# Patient Record
Sex: MALE | Race: WHITE | NOT HISPANIC OR LATINO | Employment: OTHER | URBAN - METROPOLITAN AREA
[De-identification: names, ages, dates, MRNs, and addresses within clinical notes are randomized per-mention and may not be internally consistent; named-entity substitution may affect disease eponyms.]

---

## 2021-09-02 ENCOUNTER — TRANSFERRED RECORDS (OUTPATIENT)
Dept: HEALTH INFORMATION MANAGEMENT | Facility: CLINIC | Age: 56
End: 2021-09-02
Payer: MEDICARE

## 2021-09-29 ENCOUNTER — TRANSCRIBE ORDERS (OUTPATIENT)
Dept: OTHER | Age: 56
End: 2021-09-29

## 2021-09-29 DIAGNOSIS — I48.3 TYPICAL ATRIAL FLUTTER (H): Primary | ICD-10-CM

## 2021-11-23 ENCOUNTER — TELEPHONE (OUTPATIENT)
Dept: CARDIOLOGY | Facility: CLINIC | Age: 56
End: 2021-11-23
Payer: MEDICARE

## 2021-11-23 NOTE — TELEPHONE ENCOUNTER
[facility] fax received from [facility]. Sent to be scanned into chart.    COOKIE Cabrera

## 2021-11-29 ENCOUNTER — VIRTUAL VISIT (OUTPATIENT)
Dept: CARDIOLOGY | Facility: CLINIC | Age: 56
End: 2021-11-29
Payer: MEDICARE

## 2021-11-29 DIAGNOSIS — I48.3 TYPICAL ATRIAL FLUTTER (H): Primary | ICD-10-CM

## 2021-11-29 PROCEDURE — 99204 OFFICE O/P NEW MOD 45 MIN: CPT | Mod: 95 | Performed by: INTERNAL MEDICINE

## 2021-11-29 RX ORDER — WARFARIN SODIUM 5 MG/1
TABLET ORAL
COMMUNITY
Start: 2021-09-09 | End: 2022-03-07

## 2021-11-29 RX ORDER — POTASSIUM CHLORIDE 1500 MG/1
20 TABLET, EXTENDED RELEASE ORAL
COMMUNITY
Start: 2021-10-12

## 2021-11-29 RX ORDER — METOPROLOL SUCCINATE 25 MG/1
25 TABLET, EXTENDED RELEASE ORAL
COMMUNITY
Start: 2021-11-16 | End: 2022-03-07

## 2021-11-29 RX ORDER — FUROSEMIDE 20 MG
40 TABLET ORAL
COMMUNITY
Start: 2021-11-16

## 2021-11-29 NOTE — LETTER
11/29/2021      RE: Cruz Patel  [address]  [facility] [address]       Dear Colleague,    Thank you for the opportunity to participate in the care of your patient, Cruz Patel, at the [facility] HEART [facility]. Please see a copy of my visit note below.    Cruz is a 55 year old who is being evaluated via a billable video visit.      How would you like to obtain your AVS? MyChart  If the video visit is dropped, the invitation should be resent by: Text to cell phone: [phone]  Will anyone else be joining your video visit? No    Video Start Time: 9 AM    Video-Visit Details    Type of service:  Video Visit    Video End Time: 9:15 AM    Originating Location (pt. Location): Home    Distant Location (provider location):  [employer]     Platform used for Video Visit: [employer]     HPI: Purpose of visit: I was requested by Dr. Moi Arteaga to consult on atrial flutter    Patient is a 55-year-old gentleman without any known cardiovascular disease and risk factors such as hypertension, diabetes, known coronary heart disease, and previous stroke.  He has a BMI greater than 50, nonalcoholic fatty liver disease, and metabolic syndrome.    Patient first became aware of atrial flutter when he developed cellulitis just before the Labor Day weekend and was hospitalized.  During the hospitalization, typical atrial flutter was diagnosed incidentally by ECG.  He was asymptomatic and he does not report any symptoms attributable to the atrial flutter.  He does not report symptoms of palpitations, irregular heartbeat sensation, frequent lightheadedness, presyncope or syncope.    Cardiac investigations include twelve-lead ECG showing typical atrial flutter, 1 with a ventricular rate of 75 bpm and another with a ventricular heart rate of 107 bpm.  A transthoracic echocardiogram showed mild biatrial enlargement and left  ventricular ejection fraction of 60 to 65%.    He has been started on warfarin and is also currently taking metoprolol XL 25 mg once daily.        PAST MEDICAL HISTORY:  No past medical history on file.    CURRENT MEDICATIONS:  Current Outpatient Medications   Medication Sig Dispense Refill     furosemide (LASIX) 20 MG tablet Take 40 mg by mouth       metoprolol succinate ER (TOPROL-XL) 25 MG 24 hr tablet Take 25 mg by mouth       omeprazole (PRILOSEC) 20 MG DR capsule Take 20 mg by mouth       potassium chloride ER (KLOR-CON M) 20 MEQ CR tablet Take 20 mEq by mouth       warfarin ANTICOAGULANT (COUMADIN) 5 MG tablet Take 1 tab daily or as directed by the AMS clinic.         PAST SURGICAL HISTORY:  No past surgical history on file.    ALLERGIES:   No Known Allergies    FAMILY HISTORY:  - Premature coronary artery disease  - Atrial fibrillation  - Sudden cardiac death     SOCIAL HISTORY:  Social History     Tobacco Use     Smoking status: Never Smoker     Smokeless tobacco: Current User     Types: Chew   Substance Use Topics     Alcohol use: Yes     Drug use: None       ROS:   Constitutional: No fever, chills, or sweats. Weight stable.   ENT: No visual disturbance, ear ache, epistaxis, sore throat.   Cardiovascular: As per HPI.   Respiratory: No cough, hemoptysis.    GI: No nausea, vomiting, hematemesis, melena, or hematochezia.   : No hematuria.   Integument: Negative.   Psychiatric: Negative.   Hematologic:  Easy bruising, no easy bleeding.  Neuro: Negative.   Endocrinology: No significant heat or cold intolerance   Musculoskeletal: No myalgia.    Exam:  There were no vitals taken for this visit.  GENERAL APPEARANCE: healthy, alert and no distress    Assessment and Plan:     Typical atrial flutter    I discussed extensively with patient the implications of atrial flutter and the next steps.  I recommended a 2-week Zio patch monitor to define the burden of atrial flutter.  Once we have the results of the Zio patch  monitor, we will see patient in person at the [facility].  We will discuss face-to-face the aims, risks, and alternatives to catheter ablation of atrial flutter.  All questions and concerns were addressed and patient is happy with the plan.    Please do not hesitate to contact me if you have any questions/concerns.     Sincerely,     Gabriela Garcia MD      CC  No care team member to display  ARCADIO SALEH

## 2021-11-29 NOTE — PROGRESS NOTES
Cruz is a 55 year old who is being evaluated via a billable video visit.      How would you like to obtain your AVS? [employer]  If the video visit is dropped, the invitation should be resent by: Text to cell phone: [phone]  Will anyone else be joining your video visit? No    Video Start Time: 9 AM    Video-Visit Details    Type of service:  Video Visit    Video End Time: 9:15 AM    Originating Location (pt. Location): Home    Distant Location (provider location):  [facility] HEART [facility] [employer]     Platform used for Video Visit: [employer]     HPI: Purpose of visit: I was requested by Dr. Moi Arteaga to consult on atrial flutter    Patient is a 55-year-old gentleman without any known cardiovascular disease and risk factors such as hypertension, diabetes, known coronary heart disease, and previous stroke.  He has a BMI greater than 50, nonalcoholic fatty liver disease, and metabolic syndrome.    Patient first became aware of atrial flutter when he developed cellulitis just before the Labor Day weekend and was hospitalized.  During the hospitalization, typical atrial flutter was diagnosed incidentally by ECG.  He was asymptomatic and he does not report any symptoms attributable to the atrial flutter.  He does not report symptoms of palpitations, irregular heartbeat sensation, frequent lightheadedness, presyncope or syncope.    Cardiac investigations include twelve-lead ECG showing typical atrial flutter, 1 with a ventricular rate of 75 bpm and another with a ventricular heart rate of 107 bpm.  A transthoracic echocardiogram showed mild biatrial enlargement and left ventricular ejection fraction of 60 to 65%.    He has been started on warfarin and is also currently taking metoprolol XL 25 mg once daily.        PAST MEDICAL HISTORY:  No past medical history on file.    CURRENT MEDICATIONS:  Current Outpatient Medications   Medication Sig Dispense Refill     furosemide (LASIX) 20 MG tablet Take 40 mg by  mouth       metoprolol succinate ER (TOPROL-XL) 25 MG 24 hr tablet Take 25 mg by mouth       omeprazole (PRILOSEC) 20 MG DR capsule Take 20 mg by mouth       potassium chloride ER (KLOR-CON M) 20 MEQ CR tablet Take 20 mEq by mouth       warfarin ANTICOAGULANT (COUMADIN) 5 MG tablet Take 1 tab daily or as directed by the [facility] clinic.         PAST SURGICAL HISTORY:  No past surgical history on file.    ALLERGIES:   No Known Allergies    FAMILY HISTORY:  - Premature coronary artery disease  - Atrial fibrillation  - Sudden cardiac death     SOCIAL HISTORY:  Social History     Tobacco Use     Smoking status: Never Smoker     Smokeless tobacco: Current User     Types: Chew   Substance Use Topics     Alcohol use: Yes     Drug use: None       ROS:   Constitutional: No fever, chills, or sweats. Weight stable.   ENT: No visual disturbance, ear ache, epistaxis, sore throat.   Cardiovascular: As per HPI.   Respiratory: No cough, hemoptysis.    GI: No nausea, vomiting, hematemesis, melena, or hematochezia.   : No hematuria.   Integument: Negative.   Psychiatric: Negative.   Hematologic:  Easy bruising, no easy bleeding.  Neuro: Negative.   Endocrinology: No significant heat or cold intolerance   Musculoskeletal: No myalgia.    Exam:  There were no vitals taken for this visit.  GENERAL APPEARANCE: healthy, alert and no distress    Assessment and Plan:     Typical atrial flutter    I discussed extensively with patient the implications of atrial flutter and the next steps.  I recommended a 2-week Zio patch monitor to define the burden of atrial flutter.  Once we have the results of the Zio patch monitor, we will see patient in person at the [facility].  We will discuss face-to-face the aims, risks, and alternatives to catheter ablation of atrial flutter.  All questions and concerns were addressed and patient is happy with the plan.          CC  No care team member to display  ARCADIO SALEH

## 2021-11-29 NOTE — PATIENT INSTRUCTIONS
Thank you for coming to the [facility]; please note the following instructions:    1. 2 week zio heart monitor    2.  Follow up in 1-2 month IN CLINIC for the results        If you have any questions regarding your visit please contact your care team:     Cardiology  Telephone Number   Alicia CONKLIN, RN  Lisa LY, RN   Mena LANCASTER, RMA  Love VILLARREAL, RMEVERARDO ZIMMERMAN, LPN   [phone] (option 1)   For scheduling appts:     [phone] (select option 1)       For the Device Clinic (Pacemakers and ICD's)  RN's :  Alexandra Bullard   During business hours: [phone]    *After business hours:  [phone] (select option 4)      Normal test result notifications will be released via [employer] or mailed within 7 business days.  All other test results, will be communicated via telephone once reviewed by your cardiologist.    If you need a medication refill please contact your pharmacy.  Please allow 3 business days for your refill to be completed.    As always, thank you for trusting us with your health care needs!

## 2021-11-30 ENCOUNTER — TELEPHONE (OUTPATIENT)
Dept: CARDIOLOGY | Facility: CLINIC | Age: 56
End: 2021-11-30
Payer: MEDICARE

## 2021-11-30 NOTE — TELEPHONE ENCOUNTER
14 day Zio registered to be mailed to patient for self application. Follow up with Dr. Garcia scheduled 1/10/2021 arriving at 1:45. Left a message for patient informing him his Zio will be arriving and to call us to confirm his follow up appointment works for him.    COOKIE Cabrera

## 2021-11-30 NOTE — TELEPHONE ENCOUNTER
----- Message from Alicia Haynes RN sent at 11/29/2021 11:25 AM CST -----  Mail out 2 week zio and and follow-up IN CLINIC FOR RESULTS   Alicia

## 2021-12-02 NOTE — TELEPHONE ENCOUNTER
Spoke to patient and discussed follow up.  Patient is agreeable with the plan.  States he has not received the monitor yet but will check when he gets home.    Alicia Haynes RN  Cardiology Care Coordinator  [facility]  [phone] option 1

## 2021-12-02 NOTE — TELEPHONE ENCOUNTER
[facility] Call Center    Phone Message    May a detailed message be left on voicemail: yes     Reason for Call: Other: Cruz called returning a call from [facility]. Please give Cruz a call back when possible. Thank you!     Action Taken: Message routed to:  Other: [facility]    Travel Screening: Not Applicable

## 2021-12-03 ENCOUNTER — ALLIED HEALTH/NURSE VISIT (OUTPATIENT)
Dept: CARDIOLOGY | Facility: CLINIC | Age: 56
End: 2021-12-03
Attending: INTERNAL MEDICINE
Payer: MEDICARE

## 2021-12-03 DIAGNOSIS — I48.3 TYPICAL ATRIAL FLUTTER (H): ICD-10-CM

## 2021-12-06 ENCOUNTER — TELEPHONE (OUTPATIENT)
Dept: CARDIOLOGY | Facility: CLINIC | Age: 56
End: 2021-12-06
Payer: MEDICARE

## 2021-12-06 NOTE — TELEPHONE ENCOUNTER
[facility] Call Center    Phone Message    May a detailed message be left on voicemail: yes     Reason for Call: Other: Pt has not received the heart monitor yet.  Please call him when it's mailed out so he knows when to expect it.  Concerned that he is not going to get it in time to wear it for the 2 weeks and be able go get it back before his follow up apt.      Action Taken: Message routed to:  [facility] ([facility] Cardio    Travel Screening: Not Applicable

## 2021-12-07 NOTE — TELEPHONE ENCOUNTER
Spoke with patient and informed him the Zio website states his monitor is running late with the mail. Rescheduled his appointment with Dr. Garcia out another week.    COOKIE Cabrera

## 2021-12-07 NOTE — PROGRESS NOTES
2 week ZioXT applied to patient today. Instructions on use and removal given and mail back instructions discussed. All questions answered. Consent form signed. Device registered. Form faxed to [employer].  Device number: MONITOR MAILOUT.    Love Bowen MA

## 2022-01-17 ENCOUNTER — OFFICE VISIT (OUTPATIENT)
Dept: CARDIOLOGY | Facility: CLINIC | Age: 57
End: 2022-01-17
Payer: MEDICARE

## 2022-01-17 VITALS
WEIGHT: 315 LBS | DIASTOLIC BLOOD PRESSURE: 96 MMHG | SYSTOLIC BLOOD PRESSURE: 157 MMHG | OXYGEN SATURATION: 97 % | HEART RATE: 83 BPM

## 2022-01-17 DIAGNOSIS — Z11.59 ENCOUNTER FOR SCREENING FOR OTHER VIRAL DISEASES: Primary | ICD-10-CM

## 2022-01-17 DIAGNOSIS — E66.3 OVERWEIGHT: ICD-10-CM

## 2022-01-17 DIAGNOSIS — I48.3 TYPICAL ATRIAL FLUTTER (H): Primary | ICD-10-CM

## 2022-01-17 PROCEDURE — 99214 OFFICE O/P EST MOD 30 MIN: CPT | Performed by: INTERNAL MEDICINE

## 2022-01-17 RX ORDER — POTASSIUM CHLORIDE 750 MG/1
20 TABLET, EXTENDED RELEASE ORAL
Status: CANCELLED | OUTPATIENT
Start: 2022-01-17

## 2022-01-17 RX ORDER — LIDOCAINE 40 MG/G
CREAM TOPICAL
Status: CANCELLED | OUTPATIENT
Start: 2022-01-17

## 2022-01-17 RX ORDER — POTASSIUM CHLORIDE 750 MG/1
40 TABLET, EXTENDED RELEASE ORAL
Status: CANCELLED | OUTPATIENT
Start: 2022-01-17

## 2022-01-17 NOTE — NURSING NOTE
Chief Complaint   Patient presents with     Follow Up     Typical atrial flutter, Zio result, pt reports GORMAN, SOB when using restroom, some heart palpitations, some lightheadedness upon exertion       Initial BP (!) 157/96 (BP Location: Right arm, Patient Position: Sitting, Cuff Size: Adult Large)   Pulse 83   Wt 147.4 kg (325 lb)   SpO2 97%  There is no height or weight on file to calculate BMI..  BP completed using cuff size: juve Monaco, Visit Facilitator

## 2022-01-17 NOTE — LETTER
1/17/2022      RE: Cruz Patel  [address]  [facility] [place] [facility] [address]       Dear Colleague,    Thank you for the opportunity to participate in the care of your patient, Cruz Patel, at the [facility] HEART CLINIC [facility]. Please see a copy of my visit note below.    HPI: Purpose of visit: Follow-up of atrial flutter    Patient is a 56-year-old gentleman without any known cardiovascular disease and risk factors such as hypertension, diabetes, known coronary heart disease, and previous stroke. He has a BMI greater than 50, nonalcoholic fatty liver disease, and metabolic syndrome.    Patient's last visit with me was in November 2021.  Since then, patient has 1 day Zio patch monitor for 2 weeks and he was in atrial flutter all the time with an average ventricular rate of 75 bpm.  Patient reports symptoms of exertional dyspnea with mild activity.  He also experiences some palpitations.  He does not report any exertional angina, frequent lightheadedness, presyncope or syncope.    He is currently on warfarin and recent INRs have shown values greater than 3.5 and greater than 4.    PAST MEDICAL HISTORY:  History reviewed. No pertinent past medical history.    CURRENT MEDICATIONS:  Current Outpatient Medications   Medication Sig Dispense Refill     furosemide (LASIX) 20 MG tablet Take 40 mg by mouth       metoprolol succinate ER (TOPROL-XL) 25 MG 24 hr tablet Take 25 mg by mouth       omeprazole (PRILOSEC) 20 MG DR capsule Take 20 mg by mouth       potassium chloride ER (KLOR-CON M) 20 MEQ CR tablet Take 20 mEq by mouth       warfarin ANTICOAGULANT (COUMADIN) 5 MG tablet Take 1 tab daily or as directed by the AMS clinic.         PAST SURGICAL HISTORY:  History reviewed. No pertinent surgical history.    ALLERGIES:   No Known Allergies    FAMILY HISTORY:  - Premature coronary artery disease  - Atrial fibrillation  - Sudden cardiac death     SOCIAL  HISTORY:  Social History     Tobacco Use     Smoking status: Never Smoker     Smokeless tobacco: Current User     Types: Chew   Substance Use Topics     Alcohol use: Yes     Drug use: None       ROS:   Constitutional: No fever, chills, or sweats. Weight stable.   ENT: No visual disturbance, ear ache, epistaxis, sore throat.   Cardiovascular: As per HPI.   Respiratory: No cough, hemoptysis.    GI: No nausea, vomiting, hematemesis, melena, or hematochezia.   : No hematuria.   Integument: Negative.   Psychiatric: Negative.   Hematologic:  Easy bruising, no easy bleeding.  Neuro: Negative.   Endocrinology: No significant heat or cold intolerance   Musculoskeletal: No myalgia.    Exam:  BP (!) 157/96 (BP Location: Right arm, Patient Position: Sitting, Cuff Size: Adult Large)   Pulse 83   Wt 147.4 kg (325 lb)   SpO2 97%   GENERAL APPEARANCE: healthy, alert and no distress  HEENT: no icterus, no xanthelasmas, normal pupil size and reaction, normal palate, mucosa moist, no central cyanosis  NECK: no adenopathy, no asymmetry, masses, or scars, thyroid normal to palpation and no bruits, JVP not elevated  RESPIRATORY: lungs clear to auscultation - no rales, rhonchi or wheezes, no use of accessory muscles, no retractions, respirations are unlabored, normal respiratory rate  CARDIOVASCULAR: regular rhythm, normal S1 with physiologic split S2, no S3 or S4 and no murmur, click or rub, precordium quiet with normal PMI.  ABDOMEN: soft, non tender, without hepatosplenomegaly, no masses palpable, bowel sounds normal, aorta not enlarged by palpation, no abdominal bruits  EXTREMITIES: peripheral pulses normal, no edema, no bruits  NEURO: alert and oriented to person/place/time, normal speech, gait and affect  VASC: Radial, femoral, dorsalis pedis and posterior tibialis pulses are normal in volumes and symmetric bilaterally. No bruits are heard.  SKIN: no ecchymoses, no rashes    Labs:  CBC RESULTS:   No results found for: WBC, RBC,  HGB, HCT, MCV, MCH, MCHC, RDW, PLT    BMP RESULTS:  No results found for: NA, POTASSIUM, CHLORIDE, CO2, ANIONGAP, GLC, BUN, CR, GFRESTIMATED, GFRESTBLACK, CJ     INR RESULTS:  No results found for: INR    Procedures:      Assessment and Plan:     Persistent atrial flutter    I discussed extensively with patient and his sister this complex medical situation.  I recommended the following immediate next steps:  -Patient will undergo JASSON guided cardioversion in the OR and with anesthesia because of his weight.  -I will see patient in in-person clinic at [place] about 1 to 2 months after the cardioversion.  If patient remains in sinus rhythm, we will continue watchful waiting.  If atrial flutter is recurrent, we will discuss catheter ablation.  -We will refer patient to weight management for consideration of bariatric surgery.    All questions and concerns were addressed and patient and his sister are happy with the plan.      Please do not hesitate to contact me if you have any questions/concerns.     Sincerely,     Gabriela Garcia MD    CC  Patient Care Team:  Calvin Mcgrath MD as PCP - General (Family Medicine)  Gabriela Garcia MD as Assigned Heart and Vascular Provider

## 2022-01-17 NOTE — PROGRESS NOTES
HPI: Purpose of visit: Follow-up of atrial flutter    Patient is a 56-year-old gentleman without any known cardiovascular disease and risk factors such as hypertension, diabetes, known coronary heart disease, and previous stroke. He has a BMI greater than 50, nonalcoholic fatty liver disease, and metabolic syndrome.    Patient's last visit with me was in November 2021.  Since then, patient has 1 day Zio patch monitor for 2 weeks and he was in atrial flutter all the time with an average ventricular rate of 75 bpm.  Patient reports symptoms of exertional dyspnea with mild activity.  He also experiences some palpitations.  He does not report any exertional angina, frequent lightheadedness, presyncope or syncope.    He is currently on warfarin and recent INRs have shown values greater than 3.5 and greater than 4.    PAST MEDICAL HISTORY:  History reviewed. No pertinent past medical history.    CURRENT MEDICATIONS:  Current Outpatient Medications   Medication Sig Dispense Refill     furosemide (LASIX) 20 MG tablet Take 40 mg by mouth       metoprolol succinate ER (TOPROL-XL) 25 MG 24 hr tablet Take 25 mg by mouth       omeprazole (PRILOSEC) 20 MG DR capsule Take 20 mg by mouth       potassium chloride ER (KLOR-CON M) 20 MEQ CR tablet Take 20 mEq by mouth       warfarin ANTICOAGULANT (COUMADIN) 5 MG tablet Take 1 tab daily or as directed by the AMS clinic.         PAST SURGICAL HISTORY:  History reviewed. No pertinent surgical history.    ALLERGIES:   No Known Allergies    FAMILY HISTORY:  - Premature coronary artery disease  - Atrial fibrillation  - Sudden cardiac death     SOCIAL HISTORY:  Social History     Tobacco Use     Smoking status: Never Smoker     Smokeless tobacco: Current User     Types: Chew   Substance Use Topics     Alcohol use: Yes     Drug use: None       ROS:   Constitutional: No fever, chills, or sweats. Weight stable.   ENT: No visual disturbance, ear ache, epistaxis, sore throat.   Cardiovascular: As  per HPI.   Respiratory: No cough, hemoptysis.    GI: No nausea, vomiting, hematemesis, melena, or hematochezia.   : No hematuria.   Integument: Negative.   Psychiatric: Negative.   Hematologic:  Easy bruising, no easy bleeding.  Neuro: Negative.   Endocrinology: No significant heat or cold intolerance   Musculoskeletal: No myalgia.    Exam:  BP (!) 157/96 (BP Location: Right arm, Patient Position: Sitting, Cuff Size: Adult Large)   Pulse 83   Wt 147.4 kg (325 lb)   SpO2 97%   GENERAL APPEARANCE: healthy, alert and no distress  HEENT: no icterus, no xanthelasmas, normal pupil size and reaction, normal palate, mucosa moist, no central cyanosis  NECK: no adenopathy, no asymmetry, masses, or scars, thyroid normal to palpation and no bruits, JVP not elevated  RESPIRATORY: lungs clear to auscultation - no rales, rhonchi or wheezes, no use of accessory muscles, no retractions, respirations are unlabored, normal respiratory rate  CARDIOVASCULAR: regular rhythm, normal S1 with physiologic split S2, no S3 or S4 and no murmur, click or rub, precordium quiet with normal PMI.  ABDOMEN: soft, non tender, without hepatosplenomegaly, no masses palpable, bowel sounds normal, aorta not enlarged by palpation, no abdominal bruits  EXTREMITIES: peripheral pulses normal, no edema, no bruits  NEURO: alert and oriented to person/place/time, normal speech, gait and affect  VASC: Radial, femoral, dorsalis pedis and posterior tibialis pulses are normal in volumes and symmetric bilaterally. No bruits are heard.  SKIN: no ecchymoses, no rashes    Labs:  CBC RESULTS:   No results found for: WBC, RBC, HGB, HCT, MCV, MCH, MCHC, RDW, PLT    BMP RESULTS:  No results found for: NA, POTASSIUM, CHLORIDE, CO2, ANIONGAP, GLC, BUN, CR, GFRESTIMATED, GFRESTBLACK, CJ     INR RESULTS:  No results found for: INR    Procedures:      Assessment and Plan:     Persistent atrial flutter    I discussed extensively with patient and his sister this complex  medical situation.  I recommended the following immediate next steps:  -Patient will undergo JASSON guided cardioversion in the OR and with anesthesia because of his weight.  -I will see patient in in-person clinic at [place] about 1 to 2 months after the cardioversion.  If patient remains in sinus rhythm, we will continue watchful waiting.  If atrial flutter is recurrent, we will discuss catheter ablation.  -We will refer patient to weight management for consideration of bariatric surgery.    All questions and concerns were addressed and patient and his sister are happy with the plan.      CC  Patient Care Team:  Calvin Mcgrath MD as PCP - General (Family Medicine)  Gabriela Garcia MD as Assigned Heart and Vascular Provider

## 2022-01-17 NOTE — PATIENT INSTRUCTIONS
Thank you for coming to the [facility] at [place]; please note the following instructions:    1)Weight management referral    2)  Follow up in clinic 1-2 months after cardioversion      You are scheduled for a Transesophageal Echocardiogram followed by a Cardioversion at the [facility] ([address], [place] [address], [phone]).       You will need to undergo a COVID-19 PCR swab test within 4 days of procedure. You will receive a phone call with more information. If you do not hear from the COVID scheduling team, please call: [phone] to schedule.    Follow these instructions:    1. Report to the [facility] waiting room in the [facility] hospital on: __________    2. Please do not eat anything for 8 hours prior to your procedure. You may have sips of water up until 2 hours prior to your arrival.    3. The morning of your procedure you may take your scheduled medications with a SIP of water. If you take diabetic medications or a diuretic, you may hold these.     4. You will receive general anesthesia; you will need a .    You should not make any legal decisions for 24 hours following discharge.       If you have further questions, please utilize ALTHIA to contact us.   If your question concerns the above instructions, contact:  Alicia Haynes RN  Electrophysiology Nurse Coordinator.  [phone]    If your question concerns the schedule/appointment times, contact:  AIDE [facility] Procedure   [phone]            If you have any questions regarding your visit, please contact your care team:     CARDIOLOGY  TELEPHONE NUMBER   Alicia CONKLIN, Registered Nurse  Lisa LY, Registered Nurse  Love VILLARREAL, Registered Medical Assistant  Tony ZIMMERMAN, Licensed Practical Nurse  Ana Paula CARR, Visit Facilitator [phone] (select option 1)    *After hours: [phone]   For Scheduling Appts:     [phone] (select option 1)    *After hours: [phone]   For the Device Clinic  (Pacemakers and ICD's)  Alexandra NOVA, Registered Nurse   During business hours: [phone]    *After business hours:  [phone] (select option 4)      Normal test result notifications will be released via Casengo or mailed within 7 business days.  All other test results, will be communicated via telephone once reviewed by your cardiologist.    If you need a medication refill, please contact your pharmacy.  Please allow 3 business days for your refill to be completed.    As always, thank you for trusting us with your health care needs!

## 2022-01-24 DIAGNOSIS — Z11.59 ENCOUNTER FOR SCREENING FOR OTHER VIRAL DISEASES: Primary | ICD-10-CM

## 2022-01-31 ENCOUNTER — LAB (OUTPATIENT)
Dept: LAB | Facility: CLINIC | Age: 57
End: 2022-01-31
Attending: NURSE PRACTITIONER

## 2022-01-31 DIAGNOSIS — Z11.59 ENCOUNTER FOR SCREENING FOR OTHER VIRAL DISEASES: ICD-10-CM

## 2022-01-31 LAB — SARS-COV-2 RNA RESP QL NAA+PROBE: NEGATIVE

## 2022-01-31 PROCEDURE — U0005 INFEC AGEN DETEC AMPLI PROBE: HCPCS | Performed by: INTERNAL MEDICINE

## 2022-02-01 ENCOUNTER — HOSPITAL ENCOUNTER (OUTPATIENT)
Dept: CARDIOLOGY | Facility: CLINIC | Age: 57
End: 2022-02-01
Attending: NURSE PRACTITIONER
Payer: MEDICARE

## 2022-02-01 ENCOUNTER — APPOINTMENT (OUTPATIENT)
Dept: LAB | Facility: CLINIC | Age: 57
End: 2022-02-01
Attending: FAMILY MEDICINE
Payer: MEDICARE

## 2022-02-01 ENCOUNTER — HOSPITAL ENCOUNTER (OUTPATIENT)
Facility: CLINIC | Age: 57
Discharge: HOME | End: 2022-02-01
Attending: INTERNAL MEDICINE | Admitting: INTERNAL MEDICINE
Payer: MEDICARE

## 2022-02-01 ENCOUNTER — HOSPITAL ENCOUNTER (OUTPATIENT)
Dept: CARDIOLOGY | Facility: CLINIC | Age: 57
Discharge: HOME | End: 2022-02-01
Attending: NURSE PRACTITIONER | Admitting: NURSE PRACTITIONER
Payer: MEDICARE

## 2022-02-01 ENCOUNTER — APPOINTMENT (OUTPATIENT)
Dept: MEDSURG UNIT | Facility: CLINIC | Age: 57
End: 2022-02-01
Attending: FAMILY MEDICINE
Payer: MEDICARE

## 2022-02-01 DIAGNOSIS — I48.3 TYPICAL ATRIAL FLUTTER (H): ICD-10-CM

## 2022-02-01 DIAGNOSIS — I48.3 TYPICAL ATRIAL FLUTTER (H): Primary | ICD-10-CM

## 2022-02-01 LAB
INR PPP: 1.73 (ref 0.85–1.15)
MAGNESIUM SERPL-MCNC: 2.3 MG/DL (ref 1.6–2.3)
POTASSIUM BLD-SCNC: 4.9 MMOL/L (ref 3.4–5.3)

## 2022-02-01 PROCEDURE — 84132 ASSAY OF SERUM POTASSIUM: CPT | Performed by: INTERNAL MEDICINE

## 2022-02-01 PROCEDURE — 36415 COLL VENOUS BLD VENIPUNCTURE: CPT | Performed by: INTERNAL MEDICINE

## 2022-02-01 PROCEDURE — 93010 ELECTROCARDIOGRAM REPORT: CPT | Mod: 59 | Performed by: INTERNAL MEDICINE

## 2022-02-01 PROCEDURE — 999N000054 HC STATISTIC EKG NON-CHARGEABLE

## 2022-02-01 PROCEDURE — 83735 ASSAY OF MAGNESIUM: CPT | Performed by: INTERNAL MEDICINE

## 2022-02-01 PROCEDURE — 85610 PROTHROMBIN TIME: CPT | Performed by: INTERNAL MEDICINE

## 2022-02-01 RX ORDER — ATROPINE SULFATE 0.1 MG/ML
.5-1 INJECTION INTRAVENOUS
Status: DISCONTINUED | OUTPATIENT
Start: 2022-02-01 | End: 2022-02-02 | Stop reason: HOSPADM

## 2022-02-01 RX ORDER — POTASSIUM CHLORIDE 1500 MG/1
20 TABLET, EXTENDED RELEASE ORAL
Status: DISCONTINUED | OUTPATIENT
Start: 2022-02-01 | End: 2022-02-02 | Stop reason: HOSPADM

## 2022-02-01 RX ORDER — NALOXONE HYDROCHLORIDE 0.4 MG/ML
0.2 INJECTION, SOLUTION INTRAMUSCULAR; INTRAVENOUS; SUBCUTANEOUS
Status: DISCONTINUED | OUTPATIENT
Start: 2022-02-01 | End: 2022-02-02 | Stop reason: HOSPADM

## 2022-02-01 RX ORDER — NALOXONE HYDROCHLORIDE 0.4 MG/ML
0.4 INJECTION, SOLUTION INTRAMUSCULAR; INTRAVENOUS; SUBCUTANEOUS
Status: DISCONTINUED | OUTPATIENT
Start: 2022-02-01 | End: 2022-02-02 | Stop reason: HOSPADM

## 2022-02-01 RX ORDER — LIDOCAINE 40 MG/G
CREAM TOPICAL
Status: CANCELLED | OUTPATIENT
Start: 2022-02-01

## 2022-02-01 RX ORDER — MAGNESIUM SULFATE HEPTAHYDRATE 40 MG/ML
2 INJECTION, SOLUTION INTRAVENOUS
Status: CANCELLED | OUTPATIENT
Start: 2022-02-01

## 2022-02-01 RX ORDER — SODIUM CHLORIDE 9 MG/ML
1000 INJECTION, SOLUTION INTRAVENOUS CONTINUOUS
Status: DISCONTINUED | OUTPATIENT
Start: 2022-02-01 | End: 2022-02-02 | Stop reason: HOSPADM

## 2022-02-01 RX ORDER — LIDOCAINE 40 MG/G
CREAM TOPICAL
Status: DISCONTINUED | OUTPATIENT
Start: 2022-02-01 | End: 2022-02-02 | Stop reason: HOSPADM

## 2022-02-01 RX ORDER — LIDOCAINE HYDROCHLORIDE 20 MG/ML
15 SOLUTION OROPHARYNGEAL ONCE
Status: DISCONTINUED | OUTPATIENT
Start: 2022-02-01 | End: 2022-02-02 | Stop reason: HOSPADM

## 2022-02-01 RX ORDER — METOPROLOL TARTRATE 1 MG/ML
2.5-5 INJECTION, SOLUTION INTRAVENOUS
Status: DISCONTINUED | OUTPATIENT
Start: 2022-02-01 | End: 2022-02-02 | Stop reason: HOSPADM

## 2022-02-01 RX ORDER — MAGNESIUM SULFATE HEPTAHYDRATE 40 MG/ML
2 INJECTION, SOLUTION INTRAVENOUS
Status: DISCONTINUED | OUTPATIENT
Start: 2022-02-01 | End: 2022-02-02 | Stop reason: HOSPADM

## 2022-02-01 RX ORDER — FLUMAZENIL 0.1 MG/ML
0.2 INJECTION, SOLUTION INTRAVENOUS
Status: DISCONTINUED | OUTPATIENT
Start: 2022-02-01 | End: 2022-02-02 | Stop reason: HOSPADM

## 2022-02-01 RX ORDER — POTASSIUM CHLORIDE 1500 MG/1
20 TABLET, EXTENDED RELEASE ORAL
Status: CANCELLED | OUTPATIENT
Start: 2022-02-01

## 2022-02-01 RX ORDER — FENTANYL CITRATE 50 UG/ML
50 INJECTION, SOLUTION INTRAMUSCULAR; INTRAVENOUS ONCE
Status: DISCONTINUED | OUTPATIENT
Start: 2022-02-01 | End: 2022-02-02 | Stop reason: HOSPADM

## 2022-02-01 RX ORDER — FENTANYL CITRATE 50 UG/ML
25 INJECTION, SOLUTION INTRAMUSCULAR; INTRAVENOUS
Status: DISCONTINUED | OUTPATIENT
Start: 2022-02-01 | End: 2022-02-02 | Stop reason: HOSPADM

## 2022-02-01 RX ORDER — POTASSIUM CHLORIDE 1500 MG/1
40 TABLET, EXTENDED RELEASE ORAL
Status: DISCONTINUED | OUTPATIENT
Start: 2022-02-01 | End: 2022-02-02 | Stop reason: HOSPADM

## 2022-02-01 RX ORDER — ACYCLOVIR 200 MG/1
9.5 CAPSULE ORAL
Status: DISCONTINUED | OUTPATIENT
Start: 2022-02-01 | End: 2022-02-02 | Stop reason: HOSPADM

## 2022-02-01 RX ORDER — POTASSIUM CHLORIDE 1500 MG/1
40 TABLET, EXTENDED RELEASE ORAL
Status: CANCELLED | OUTPATIENT
Start: 2022-02-01

## 2022-02-01 NOTE — DISCHARGE INSTRUCTIONS
GOING HOME AFTER YOUR TRANSESOPHAGEAL ECHOCARDIOGRAM AND CARDIOVERSION    FOR NEXT 24 HOURS:    An adult should stay with you.    Relax and take it easy.    DO NOT make any important legal decisions.    DO NOT drive or operate machines at home or at work.    DO NOT consume any alcohol today.    Resume your regular diet 2 HOURS after procedure @ ___________ and drink plenty of fluids.    If your throat is sore, eat cold, bland, soft foods.    If you have any redness/skin sorness where the patches were placed, you may use aloe vera gel or 1% hydrocortisone cream to the skin (sold at drug stores)    Take Tylenol (Acetaminophen) per your provider's recommendations as needed to help relieve local pain.     CALL YOUR HEALTHCARE PROVIDER IF:    New pain or trouble swallowing    New stomach or chest pain    You develop nausea or vomiting    Fever above 100.6 F or greater    You develop hives or a rash or any unexplained itching    Have irregular heartbeat or fast pulse    Feel faint, dizzy, or lightheaded    Have chest pain with increased activity    Have bleeding issues from blood-thinning medicines (vomiting that looks like coffee grounds or contains blood OR black, bloody stools).    CALL 911 RIGHT AWAY IF YOU HAVE:    Pain in your chest, arm, shoulder, neck, or upper back    Shortness of breath    Loss of vision, speech, or strength or coordination in any body part    Weakness in your arm or leg    Uncontrolled bleeding    Feel unstable in any way     FOLLOW UP APPOINTMENT:      Follow up as scheduled with EP/Cardiology Provider     ADDITIONAL INFORMATION:    If you have any questions:        Call the Echo Lab Nurse at [phone], press 4 (Monday-Friday 7:00 am - 4:00 pm)        Call the hospital: [phone] and ask them to page [phone] (after 4:00 pm and on   weekends or holidays)      Cardiovascular Clinic:   [address] [place] [address]. [place] [address]  Your Care Team:  EP Cardiology   Telephone Number     Martha  Lito Ortega NP [phone]   Leyla Leung RN  Nehal Pedersen RN  [phone]     For scheduling appts or procedures:    Shaunna Huff   [phone]   For the Device Clinic (Pacemakers and ICD's)   RN's :   Alexandra Bulladr  During business hours: [phone]     After business hours:   [phone]- select option 4 and ask for job code [id number].       As always, Thank you for trusting us with your health care needs!

## 2022-02-01 NOTE — PROGRESS NOTES
Patient presented for JASSON/DCCV today. Patient concerned about JASSON under moderate sedation only. Offered alternative OMER thrombus rule out with CT heart scan instead which he was very agreeable to. Unfortunately, INR resulted subtherapeutic at 1.7. Discussed options of subcutaneous lovenox injections 1mg/kg q12 hours until INR is therapeutic or would need to reschedule. He elected to reschedule. Will contact EP  to coordinate CT scan prior to DCCV next week to allow time for INR correction. We are notifying his Coumadin Clinic to adjust Warfarin dosing.   Patient states understanding and is agreeable with the plan.   ALBERTO Foster CNP  Electrophysiology Consult Service  Pager: [phone]

## 2022-02-03 ENCOUNTER — TELEPHONE (OUTPATIENT)
Dept: CARDIOLOGY | Facility: CLINIC | Age: 57
End: 2022-02-03
Payer: MEDICARE

## 2022-02-03 LAB
ATRIAL RATE - MUSE: 288 BPM
DIASTOLIC BLOOD PRESSURE - MUSE: NORMAL MMHG
INTERPRETATION ECG - MUSE: NORMAL
P AXIS - MUSE: 68 DEGREES
PR INTERVAL - MUSE: NORMAL MS
QRS DURATION - MUSE: 74 MS
QT - MUSE: 392 MS
QTC - MUSE: 397 MS
R AXIS - MUSE: 64 DEGREES
SYSTOLIC BLOOD PRESSURE - MUSE: NORMAL MMHG
T AXIS - MUSE: 12 DEGREES
VENTRICULAR RATE- MUSE: 62 BPM

## 2022-02-03 NOTE — TELEPHONE ENCOUNTER
EP Scheduling called the patient to schedule cardioversion with CT prior. The number [phone] was left for the patient to return the call and schedule the procedure.    Shaunna Huff  Periop Electrophysiology   [phone]

## 2022-02-04 ENCOUNTER — TELEPHONE (OUTPATIENT)
Dept: CARDIOLOGY | Facility: CLINIC | Age: 57
End: 2022-02-04
Payer: MEDICARE

## 2022-02-04 NOTE — TELEPHONE ENCOUNTER
[facility] Call Center    Phone Message    May a detailed message be left on voicemail: yes     Reason for Call: Other: Clarification on faxes received today. 1st one received with Covid results, Then another stating need for Covid test to be done Jan 31st. Do they need to do another COVID test?     Action Taken: Other: [facility] [[id number]]    Travel Screening: Not Applicable

## 2022-02-06 ENCOUNTER — ANESTHESIA EVENT (OUTPATIENT)
Dept: SURGERY | Facility: CLINIC | Age: 57
End: 2022-02-06
Payer: MEDICARE

## 2022-02-06 ASSESSMENT — ENCOUNTER SYMPTOMS: DYSRHYTHMIAS: 1

## 2022-02-07 ENCOUNTER — HOSPITAL ENCOUNTER (OUTPATIENT)
Dept: CARDIOLOGY | Facility: CLINIC | Age: 57
End: 2022-02-07
Attending: NURSE PRACTITIONER | Admitting: INTERNAL MEDICINE
Payer: MEDICARE

## 2022-02-07 ENCOUNTER — APPOINTMENT (OUTPATIENT)
Dept: MEDSURG UNIT | Facility: CLINIC | Age: 57
End: 2022-02-07
Attending: NURSE PRACTITIONER
Payer: MEDICARE

## 2022-02-07 ENCOUNTER — HOSPITAL ENCOUNTER (OUTPATIENT)
Facility: CLINIC | Age: 57
Discharge: HOME | End: 2022-02-07
Attending: INTERNAL MEDICINE | Admitting: NURSE PRACTITIONER
Payer: MEDICARE

## 2022-02-07 ENCOUNTER — ANESTHESIA (OUTPATIENT)
Dept: SURGERY | Facility: CLINIC | Age: 57
End: 2022-02-07
Payer: MEDICARE

## 2022-02-07 ENCOUNTER — HOSPITAL ENCOUNTER (OUTPATIENT)
Dept: CT IMAGING | Facility: CLINIC | Age: 57
End: 2022-02-07
Attending: NURSE PRACTITIONER
Payer: MEDICARE

## 2022-02-07 VITALS
DIASTOLIC BLOOD PRESSURE: 100 MMHG | RESPIRATION RATE: 17 BRPM | HEART RATE: 79 BPM | SYSTOLIC BLOOD PRESSURE: 145 MMHG | OXYGEN SATURATION: 99 %

## 2022-02-07 DIAGNOSIS — I48.3 TYPICAL ATRIAL FLUTTER (H): ICD-10-CM

## 2022-02-07 LAB
INR PPP: 2.25 (ref 0.85–1.15)
MAGNESIUM SERPL-MCNC: 2.1 MG/DL (ref 1.8–2.6)
POTASSIUM BLD-SCNC: 4.6 MMOL/L (ref 3.4–5.3)

## 2022-02-07 PROCEDURE — 85610 PROTHROMBIN TIME: CPT | Performed by: NURSE PRACTITIONER

## 2022-02-07 PROCEDURE — 999N000054 HC STATISTIC EKG NON-CHARGEABLE

## 2022-02-07 PROCEDURE — 999N000248 HC STATISTIC IV INSERT WITH US BY RN

## 2022-02-07 PROCEDURE — 93010 ELECTROCARDIOGRAM REPORT: CPT | Mod: 76 | Performed by: INTERNAL MEDICINE

## 2022-02-07 PROCEDURE — 999N000128 HC STATISTIC PERIPHERAL IV START W/O US GUIDANCE

## 2022-02-07 PROCEDURE — 93005 ELECTROCARDIOGRAM TRACING: CPT

## 2022-02-07 PROCEDURE — 83735 ASSAY OF MAGNESIUM: CPT | Performed by: NURSE PRACTITIONER

## 2022-02-07 PROCEDURE — 92960 CARDIOVERSION ELECTRIC EXT: CPT

## 2022-02-07 PROCEDURE — G1004 CDSM NDSC: HCPCS

## 2022-02-07 PROCEDURE — G1004 CDSM NDSC: HCPCS | Performed by: INTERNAL MEDICINE

## 2022-02-07 PROCEDURE — 75572 CT HRT W/3D IMAGE: CPT | Mod: 26 | Performed by: INTERNAL MEDICINE

## 2022-02-07 PROCEDURE — 84132 ASSAY OF SERUM POTASSIUM: CPT | Performed by: NURSE PRACTITIONER

## 2022-02-07 PROCEDURE — 250N000011 HC RX IP 250 OP 636: Performed by: INTERNAL MEDICINE

## 2022-02-07 PROCEDURE — 36415 COLL VENOUS BLD VENIPUNCTURE: CPT | Performed by: NURSE PRACTITIONER

## 2022-02-07 PROCEDURE — 250N000009 HC RX 250: Performed by: NURSE ANESTHETIST, CERTIFIED REGISTERED

## 2022-02-07 PROCEDURE — 92960 CARDIOVERSION ELECTRIC EXT: CPT | Performed by: NURSE PRACTITIONER

## 2022-02-07 PROCEDURE — 370N000017 HC ANESTHESIA TECHNICAL FEE, PER MIN

## 2022-02-07 RX ORDER — POTASSIUM CHLORIDE 1500 MG/1
40 TABLET, EXTENDED RELEASE ORAL
Status: DISCONTINUED | OUTPATIENT
Start: 2022-02-07 | End: 2022-02-08 | Stop reason: HOSPADM

## 2022-02-07 RX ORDER — IOPAMIDOL 755 MG/ML
120 INJECTION, SOLUTION INTRAVASCULAR ONCE
Status: COMPLETED | OUTPATIENT
Start: 2022-02-07 | End: 2022-02-07

## 2022-02-07 RX ORDER — LIDOCAINE 40 MG/G
CREAM TOPICAL
Status: DISCONTINUED | OUTPATIENT
Start: 2022-02-07 | End: 2022-02-08 | Stop reason: HOSPADM

## 2022-02-07 RX ORDER — POTASSIUM CHLORIDE 1500 MG/1
20 TABLET, EXTENDED RELEASE ORAL
Status: DISCONTINUED | OUTPATIENT
Start: 2022-02-07 | End: 2022-02-08 | Stop reason: HOSPADM

## 2022-02-07 RX ORDER — MAGNESIUM SULFATE HEPTAHYDRATE 40 MG/ML
2 INJECTION, SOLUTION INTRAVENOUS
Status: DISCONTINUED | OUTPATIENT
Start: 2022-02-07 | End: 2022-02-08 | Stop reason: HOSPADM

## 2022-02-07 RX ADMIN — IOPAMIDOL 120 ML: 755 INJECTION, SOLUTION INTRAVENOUS at 09:41

## 2022-02-07 RX ADMIN — METHOHEXITAL SODIUM 100 MG: 500 INJECTION, POWDER, LYOPHILIZED, FOR SOLUTION INTRAMUSCULAR; INTRAVENOUS; RECTAL at 11:22

## 2022-02-07 ASSESSMENT — ACTIVITIES OF DAILY LIVING (ADL)
ADLS_ACUITY_SCORE: 12

## 2022-02-07 NOTE — ANESTHESIA POSTPROCEDURE EVALUATION
Patient: Cruz Patel    Procedure: Procedure(s):  ANESTHESIA, FOR CARDIOVERSION       Diagnosis:A-fib (H) [I48.91]  Diagnosis Additional Information: No value filed.    Anesthesia Type:  MAC    Note:  Disposition: Outpatient   Postop Pain Control: Uneventful            Sign Out: Well controlled pain   PONV: No   Neuro/Psych:    Airway/Respiratory: Uneventful            Sign Out: Acceptable/Baseline resp. status   CV/Hemodynamics: Uneventful            Sign Out: Acceptable CV status; No obvious hypovolemia; No obvious fluid overload   Other NRE: NONE   DID A NON-ROUTINE EVENT OCCUR? No           Last vitals:  Vitals Value Taken Time   BP     Temp     Pulse     Resp     SpO2         Electronically Signed By: Montez May DO  February 7, 2022  1:10 PM

## 2022-02-07 NOTE — ANESTHESIA CARE TRANSFER NOTE
Patient: Cruz Patel    Procedure: Procedure(s):  ANESTHESIA, FOR CARDIOVERSION       Diagnosis: A-fib (H) [I48.91]  Diagnosis Additional Information: No value filed.    Anesthesia Type:   MAC     Note:      Level of Consciousness: awake  Oxygen Supplementation: nasal cannula    Independent Airway: airway patency satisfactory and stable  Dentition: dentition unchanged      Patient transferred to: Phase II    Handoff Report: Identifed the Patient, Identified the Reponsible Provider, Reviewed the pertinent medical history, Discussed the surgical course, Reviewed Intra-OP anesthesia mangement and issues during anesthesia, Set expectations for post-procedure period and Allowed opportunity for questions and acknowledgement of understanding      Vitals:  Vitals Value Taken Time   /89 02/07/22 1136   Temp     Pulse 73 02/07/22 1136   Resp 28 02/07/22 1136   SpO2 97 % 02/07/22 1136       Electronically Signed By: ALBERTO Phillips CRNA  February 7, 2022  11:43 AM

## 2022-02-07 NOTE — ANESTHESIA PREPROCEDURE EVALUATION
Anesthesia Pre-Procedure Evaluation    Patient: Cruz Patel   MRN: [id number] : 1965        Preoperative Diagnosis: A-fib (H) [I48.91]    Procedure : Procedure(s):  ANESTHESIA, FOR CARDIOVERSION          No past medical history on file.   No past surgical history on file.   No Known Allergies   Social History     Tobacco Use     Smoking status: Never Smoker     Smokeless tobacco: Current User     Types: Chew   Substance Use Topics     Alcohol use: Yes      Wt Readings from Last 1 Encounters:   22 147.4 kg (325 lb)        Anesthesia Evaluation            ROS/MED HX  ENT/Pulmonary:     (+) BLANQUITA risk factors, obese,     Neurologic:       Cardiovascular:     (+) -----dysrhythmias, a-flutter,     METS/Exercise Tolerance:     Hematologic: Comments: Anticoagulated       Musculoskeletal:       GI/Hepatic:     (+) liver disease,     Renal/Genitourinary:       Endo:     (+) Obesity,     Psychiatric/Substance Use:       Infectious Disease:       Malignancy:       Other:            Physical Exam    Airway  airway exam normal      Mallampati: II   TM distance: > 3 FB   Neck ROM: full   Mouth opening: > 3 cm    Respiratory Devices and Support         Dental  no notable dental history         Cardiovascular   cardiovascular exam normal       Rhythm and rate: regular and normal     Pulmonary   pulmonary exam normal        breath sounds clear to auscultation           OUTSIDE LABS:  CBC: No results found for: WBC, HGB, HCT, PLT  BMP:   Lab Results   Component Value Date    POTASSIUM 4.9 2022     COAGS:   Lab Results   Component Value Date    INR 1.73 (H) 2022     POC: No results found for: BGM, HCG, HCGS  HEPATIC: No results found for: ALBUMIN, PROTTOTAL, ALT, AST, GGT, ALKPHOS, BILITOTAL, BILIDIRECT, LIZZY  OTHER:   Lab Results   Component Value Date    MAG 2.3 2022       Anesthesia Plan    ASA Status:  3   NPO Status:  NPO Appropriate    Anesthesia Type: MAC.     - Reason for MAC: immobility  needed   Induction: Intravenous.           Consents    Anesthesia Plan(s) and associated risks, benefits, and realistic alternatives discussed. Questions answered and patient/representative(s) expressed understanding.    - Discussed:     - Discussed with:  Patient      - Extended Intubation/Ventilatory Support Discussed: No.    Use of blood products discussed: No .     Postoperative Care            Comments:                Janeth Boyce MD

## 2022-02-07 NOTE — DISCHARGE INSTRUCTIONS
GOING HOME AFTER YOUR CARDIOVERSION    FOR NEXT 24 HOURS:    An adult should stay with you.    Relax and take it easy.    DO NOT make any important legal decisions.    DO NOT drive or operate machines at home or at work.    Resume your regular diet and drink plenty of fluids.    If you have any redness/skin sorness where the patches were placed, you may use aloe vera gel or 1% hydrocortisone cream to the skin (sold at drug stores)    Take Tylenol (Acetaminophen) per your provider's recommendations as needed to help relieve local pain.     CALL YOUR HEALTHCARE PROVIDER IF:    You develop nausea or vomiting    You develop hives or a rash or any unexplained itching    Have irregular heartbeat or fast pulse    Feel faint, dizzy, or lightheaded    Have chest pain with increased activity    Have bleeding issues from blood-thinning medicines    CALL 911 RIGHT AWAY IF YOU HAVE:    Pain in your chest, arm, shoulder, neck, or upper back    Shortness of breath    Loss of vision, speech, or strength or coordination in any body part    Weakness in your arm or leg    Uncontrolled bleeding    Feel unstable in any way     FOLLOW UP APPOINTMENT:      Follow up as scheduled with EP/Cardiology Provider in 4 weeks    ADDITIONAL INFORMATION:  Cardiovascular Clinic:   [address] [place] [address]. [place] [address]  Your Care Team:  EP Cardiology   Telephone Number     Martha Warren NP, Dr. *** [phone]   Leyla Pedersen RN  [phone]     For scheduling appts or procedures:    Shaunna Huff   [phone]   For the Device Clinic (Pacemakers and ICD's)   RN's :   Alexandra Bullard  During business hours: [phone]     After business hours:   [phone]- select option 4 and ask for job code [id number].       As always, Thank you for trusting us with your health care needs!

## 2022-02-07 NOTE — SEDATION DOCUMENTATION
Pt arrived in ECHO department  for scheduled CT scan to rule out OMER clot and cardioversion.  Labs WNL.  Procedure explained, questions answered and consent signed. Discharge instructions discussed with patient.  No clots visualized on CT so proceeded with DCCV.  Anesthesia gave pt IV brevitol for sedation and pt was DCCV at 150 Joules to a SR.  Pt denied pain after procedure and was D/C home after awake and VSS.  Escorted out to front lobby by staff in w/c to meet pt's ride home.

## 2022-02-08 LAB
ATRIAL RATE - MUSE: 252 BPM
ATRIAL RATE - MUSE: 77 BPM
DIASTOLIC BLOOD PRESSURE - MUSE: NORMAL MMHG
DIASTOLIC BLOOD PRESSURE - MUSE: NORMAL MMHG
INTERPRETATION ECG - MUSE: NORMAL
INTERPRETATION ECG - MUSE: NORMAL
P AXIS - MUSE: 46 DEGREES
P AXIS - MUSE: 95 DEGREES
PR INTERVAL - MUSE: 292 MS
PR INTERVAL - MUSE: NORMAL MS
QRS DURATION - MUSE: 86 MS
QRS DURATION - MUSE: 86 MS
QT - MUSE: 410 MS
QT - MUSE: 416 MS
QTC - MUSE: 425 MS
QTC - MUSE: 463 MS
R AXIS - MUSE: 143 DEGREES
R AXIS - MUSE: 79 DEGREES
SYSTOLIC BLOOD PRESSURE - MUSE: NORMAL MMHG
SYSTOLIC BLOOD PRESSURE - MUSE: NORMAL MMHG
T AXIS - MUSE: 12 DEGREES
T AXIS - MUSE: 32 DEGREES
VENTRICULAR RATE- MUSE: 63 BPM
VENTRICULAR RATE- MUSE: 77 BPM

## 2022-02-17 NOTE — PROCEDURES
[facility]    Procedure: Cardioversion    Date/Time: 2/7/2022 11:40 AM  Performed by: Martha Elkins APRN CNP  Authorized by: Martha Elkins APRN CNP       UNIVERSAL PROTOCOL   Site Marked: NA  Prior Images Obtained and Reviewed:  NA  Required items: Required blood products, implants, devices and special equipment available    Patient identity confirmed:  Verbally with patient  Patient was reevaluated immediately before administering moderate or deep sedation or anesthesia  Confirmation Checklist:  Patient's identity using two indicators  Time out: Immediately prior to the procedure a time out was called    Universal Protocol: the Joint Commission Universal Protocol was followed       ANESTHESIA  Anesthesia was administered and monitored by anesthesiology.  See anesthesia documentation for details.    PROCEDURE DETAILS  Pre-procedure rhythm: atrial fibrillation  Patient position: patient was placed in a supine position  Chest area: chest area exposed  Electrodes: pads  Electrodes placed: anterior-posterior  Number of attempts: 1    Details of Attempts:  150J synchronized biphasic shock delivered with successful conversion to sinus     Post-procedure rhythm: normal sinus rhythm  Complications: no complications      PROCEDURE  Describe Procedure: \  Patient Tolerance:  Patient tolerated the procedure well with no immediate complications  Length of time physician/provider present for 1:1 monitoring during sedation: 0

## 2022-02-18 ENCOUNTER — TELEPHONE (OUTPATIENT)
Dept: CARDIOLOGY | Facility: CLINIC | Age: 57
End: 2022-02-18

## 2022-02-18 ENCOUNTER — TELEPHONE (OUTPATIENT)
Dept: CARDIOLOGY | Facility: CLINIC | Age: 57
End: 2022-02-18
Payer: MEDICARE

## 2022-02-18 DIAGNOSIS — I48.3 TYPICAL ATRIAL FLUTTER (H): Primary | ICD-10-CM

## 2022-02-18 PROCEDURE — 93000 ELECTROCARDIOGRAM COMPLETE: CPT | Performed by: INTERNAL MEDICINE

## 2022-02-18 NOTE — TELEPHONE ENCOUNTER
Fax machine confirmation received x2, then patient notified .Tony Mcgrath L.P.N.,[facility].

## 2022-02-18 NOTE — TELEPHONE ENCOUNTER
Spoke with Cruz 2/17/22 and asked if his visit with Dr. Garcia can be changed to a virtual visit or would he like to reschedule an in clinic appointment. He was good with a video. I informed him we need an EKG prior to visit. He will have this done at his primary clinic.   Dr. Calvin Mcgrath MD at [facility].   Phone [phone]. Fax order to [phone].     COOKIE Cabrera

## 2022-02-18 NOTE — TELEPHONE ENCOUNTER
[facility] Call Center    Phone Message    May a detailed message be left on voicemail: yes     Reason for Call: Other: patient requesting a call back form Heidy Mcgrath LPN regarding about faxing paperwork      Action Taken: Other: clinical pool    Travel Screening: Not Applicable

## 2022-02-18 NOTE — TELEPHONE ENCOUNTER
[facility] Call Center    Phone Message    May a detailed message be left on voicemail: yes     Reason for Call: Other: patient is calling back to check on the status of the EKG orders being sent to pcp. patient states the fax number that they faxed it to was incorrect .. patient iss requesting to talk to a care team      Action Taken: Other: clinical pool    Travel Screening: Not Applicable

## 2022-02-18 NOTE — TELEPHONE ENCOUNTER
The patient called back because order has not been received   According to msg below the order was faxed to wrong number.  Please fax to order to [phone].   Patient would like a call to inform him this had been completed

## 2022-02-18 NOTE — TELEPHONE ENCOUNTER
----- Message from Alicia Haynes RN sent at 2/14/2022  3:26 PM CST -----  Can you call cruz to come in for an EKG thanks Alicia  ----- Message -----  From: Gabriela Garcia MD  Sent: 2/14/2022   3:20 PM CST  To: Alicia Haynes RN    Can be video clinic. Have him show up to do an ECG prior to video clinic on 3/7.  ----- Message -----  From: Alicia Haynes RN  Sent: 2/9/2022   8:18 AM CST  To: Gabriela Garcia MD, Love Bowen CMA, #    Cruz had his cardioversion 2/7  Follow-up was to be in clinic-can I add him to March 14 in clinic day?  You already have 1 add on that day    Alicia    March 7th-virtual  3/14 in clinic FULL  3/21 virtual  3/28 VACATION  4/3 virtual  4/11 in clinic -FULL  4/18 virtual  4/25 in clinic (first in clinic day with some openings)

## 2022-02-18 NOTE — TELEPHONE ENCOUNTER
Ekg order, with 02/18/22 [facility] notes faxed to [phone], and patient notified  . Tony Mcgrath L.P.N.,[facility].

## 2022-02-21 ENCOUNTER — TELEPHONE (OUTPATIENT)
Dept: CARDIOLOGY | Facility: CLINIC | Age: 57
End: 2022-02-21
Payer: MEDICARE

## 2022-02-21 DIAGNOSIS — I48.3 TYPICAL ATRIAL FLUTTER (H): Primary | ICD-10-CM

## 2022-02-21 NOTE — TELEPHONE ENCOUNTER
[facility] Call Center    Phone Message    May a detailed message be left on voicemail: yes     Reason for Call: Order(s): Other:   Reason for requested: Pt states was told to do EKG prior to Dr. Garcia visit on 3/7/22 wants this to be fax to PCP clinic Fax # [phone] so he can do that there.  Date needed: Prior to 3/7/22  Provider name: Dr. Garcia      Action Taken: Message routed to:  Other: Cardiology    Travel Screening: Not Applicable

## 2022-02-21 NOTE — TELEPHONE ENCOUNTER
Patient called back with fax number. Second patient message was started. EKG faxed.     COOKIE Cabrera

## 2022-03-06 NOTE — PROGRESS NOTES
"Electrophysiology Clinic Video Virtual Visit    Cruz Patel is a 56 year old male who is being evaluated via a billable video visit.      The patient has been notified of following:     \"This video visit will be conducted via a call between you and your physician/provider. We have found that certain health care needs can be provided without the need for an in-person physical exam.  This service lets us provide the care you need with a video conversation.  If a prescription is necessary we can send it directly to your pharmacy.  If lab work is needed we can place an order for that and you can then stop by our lab to have the test done at a later time.    If during the course of the call the physician/provider feels a video visit is not appropriate, you will not be charged for this service.\"     Physician/provider has received verbal consent for a Video Visit from the patient? Yes    HPI:   56-year-old gentleman without any known cardiovascular disease and risk factors such as hypertension, diabetes, known coronary heart disease, and previous stroke.     He has a BMI greater than 50, nonalcoholic fatty liver disease, and metabolic syndrome.     Patient's last visit with Dr Garcia was in January 2022.  After his ziopatch  revealed that he was in atrial flutter all the time he was referred for cardioversion which was done on 2/7/2022. It was successful and, post cardioversion, he converted to sinus rhythm with 1st degree AV block. He presents today for follow up of his cardioversion and discussion of further treatment options. His most recent EKG done on 2/22/2022 demonstrates that he remains in sinus rhytym with 1st degree AV block.    He does not report any exertional angina, frequent lightheadedness, presyncope or syncope.     PAST MEDICAL HISTORY:  No past medical history on file.    CURRENT MEDICATIONS:  Current Outpatient Medications   Medication Sig Dispense Refill     furosemide (LASIX) 20 MG tablet Take 40 mg " by mouth       metoprolol succinate ER (TOPROL-XL) 25 MG 24 hr tablet Take 25 mg by mouth       omeprazole (PRILOSEC) 20 MG DR capsule Take 20 mg by mouth       potassium chloride ER (KLOR-CON M) 20 MEQ CR tablet Take 20 mEq by mouth       warfarin ANTICOAGULANT (COUMADIN) 5 MG tablet Take 1 tab daily or as directed by the [facility] clinic.         PAST SURGICAL HISTORY:  Past Surgical History:   Procedure Laterality Date     ANESTHESIA CARDIOVERSION N/A 2/7/2022    Procedure: ANESTHESIA, FOR CARDIOVERSION;  Surgeon: GENERIC ANESTHESIA PROVIDER;  Location:  OR       ALLERGIES:   No Known Allergies    FAMILY HISTORY:  No family history on file.    SOCIAL HISTORY:  Social History     Tobacco Use     Smoking status: Never Smoker     Smokeless tobacco: Current User     Types: Chew   Substance Use Topics     Alcohol use: Yes     Drug use: Not on file       ROS:  10 point ROS neg other than the symptoms noted above in the HPI.    Exam:  The rest of a comprehensive physical examination is deferred due to public health emergency video visit restrictions.  CONSITUTIONAL: no acute distress  HEENT: no icterus, no redness or discharge, neck supple  CV: no visible edema of visualized extremities. No JVD.   RESPIRATORY: respirations nonlabored, no cough  NEURO: AA&Ox3, speech fluent/appropriate, motor grossly nonfocal  PSYCH: cooperative, affect appropriate  DERM: no rashes on visualized face/neck/upper extremities    Labs:  Reviewed.     Testing/Procedures:  CTA  Angiogram  2/7/2022  IMPRESSION:  1. Normal pulmonary venous anatomy with all pulmonary veins draining  into the left atrium.  2. The left atrial appendage is free of thrombus.   3. Please review Radiology report for incidental non-cardiac findings.        Assessment and Plan:   56-year-old gentleman history of atrial flutter and recent cardioversion done on February 7, 2022 with conversion to sinus rhythm with first-degree AV block who presents for follow-up in EP clinic  post successful cardioversion.    #Atrial flutter s/p successful cardioversion- can discontinue Metoprolol as has no other indication for it  [id number] 0-no need to continue coumadin from arrhythmia perspective as [id number] is 0 and he has completed 1 month of anticoagulation post cardioversion  -Follow-up in 3-4 months in person with EKG to be done in clinic at that visit to assess for recurrence of atrial flutter  -If flutter recurs, can consider ablation given that flutter is typical    Discussed with MD MD Omari Jones MD  Fellow Physician PGY-4  Division of Cardiovascular Disease      Video-Visit Details    Type of service:  Video Visit    Video Start: 3:22 PM  Video End: 3:28 PM      Originating Location (pt. Location): Home    Distant Location (provider location):  [facility] HEART [facility]     Mode of Communication:  Video Conference via BuildFax    I have seen and interviewed patient. I have reviewed the laboratory tests, imaging, and other investigations. I have reviewed the management plan with the patient. I discussed with the team and agree with the findings and plan in this resident/fellow/nurse practitioner's note. In addition, assessment and plan have been incorporated into the note by myself, as to make it a single cohesive document.     Gabriela Garcia MD, MS  Cardiology/EP Staff Attending

## 2022-03-07 ENCOUNTER — VIRTUAL VISIT (OUTPATIENT)
Dept: CARDIOLOGY | Facility: CLINIC | Age: 57
End: 2022-03-07
Payer: MEDICARE

## 2022-03-07 DIAGNOSIS — I48.3 TYPICAL ATRIAL FLUTTER (H): Primary | ICD-10-CM

## 2022-03-07 PROCEDURE — 99214 OFFICE O/P EST MOD 30 MIN: CPT | Mod: 95 | Performed by: INTERNAL MEDICINE

## 2022-03-07 NOTE — LETTER
"3/7/2022      RE: Cruz Patel  [address]  [facility] [address]       Dear Colleague,    Thank you for the opportunity to participate in the care of your patient, Cruz Patel, at the [facility]IC [facility] at [facility]. Please see a copy of my visit note below.    Electrophysiology Clinic Video Virtual Visit    Cruz Patel is a 56 year old male who is being evaluated via a billable video visit.      The patient has been notified of following:     \"This video visit will be conducted via a call between you and your physician/provider. We have found that certain health care needs can be provided without the need for an in-person physical exam.  This service lets us provide the care you need with a video conversation.  If a prescription is necessary we can send it directly to your pharmacy.  If lab work is needed we can place an order for that and you can then stop by our lab to have the test done at a later time.    If during the course of the call the physician/provider feels a video visit is not appropriate, you will not be charged for this service.\"     Physician/provider has received verbal consent for a Video Visit from the patient? Yes    HPI:   56-year-old gentleman without any known cardiovascular disease and risk factors such as hypertension, diabetes, known coronary heart disease, and previous stroke.     He has a BMI greater than 50, nonalcoholic fatty liver disease, and metabolic syndrome.     Patient's last visit with Dr Garcia was in January 2022.  After his ziopatch  revealed that he was in atrial flutter all the time he was referred for cardioversion which was done on 2/7/2022. It was successful and, post cardioversion, he converted to sinus rhythm with 1st degree AV block. He presents today for follow up of his cardioversion and discussion of further treatment options. His most recent EKG done on 2/22/2022 demonstrates that he remains " in sinus rhytym with 1st degree AV block.    He does not report any exertional angina, frequent lightheadedness, presyncope or syncope.     PAST MEDICAL HISTORY:  No past medical history on file.    CURRENT MEDICATIONS:  Current Outpatient Medications   Medication Sig Dispense Refill     furosemide (LASIX) 20 MG tablet Take 40 mg by mouth       metoprolol succinate ER (TOPROL-XL) 25 MG 24 hr tablet Take 25 mg by mouth       omeprazole (PRILOSEC) 20 MG DR capsule Take 20 mg by mouth       potassium chloride ER (KLOR-CON M) 20 MEQ CR tablet Take 20 mEq by mouth       warfarin ANTICOAGULANT (COUMADIN) 5 MG tablet Take 1 tab daily or as directed by the AMS clinic.         PAST SURGICAL HISTORY:  Past Surgical History:   Procedure Laterality Date     ANESTHESIA CARDIOVERSION N/A 2/7/2022    Procedure: ANESTHESIA, FOR CARDIOVERSION;  Surgeon: GENERIC ANESTHESIA PROVIDER;  Location:  OR       ALLERGIES:   No Known Allergies    FAMILY HISTORY:  No family history on file.    SOCIAL HISTORY:  Social History     Tobacco Use     Smoking status: Never Smoker     Smokeless tobacco: Current User     Types: Chew   Substance Use Topics     Alcohol use: Yes     Drug use: Not on file       ROS:  10 point ROS neg other than the symptoms noted above in the HPI.    Exam:  The rest of a comprehensive physical examination is deferred due to public health emergency video visit restrictions.  CONSITUTIONAL: no acute distress  HEENT: no icterus, no redness or discharge, neck supple  CV: no visible edema of visualized extremities. No JVD.   RESPIRATORY: respirations nonlabored, no cough  NEURO: AA&Ox3, speech fluent/appropriate, motor grossly nonfocal  PSYCH: cooperative, affect appropriate  DERM: no rashes on visualized face/neck/upper extremities    Labs:  Reviewed.     Testing/Procedures:  CTA  Angiogram  2/7/2022  IMPRESSION:  1. Normal pulmonary venous anatomy with all pulmonary veins draining  into the left atrium.  2. The left atrial  appendage is free of thrombus.   3. Please review Radiology report for incidental non-cardiac findings.        Assessment and Plan:   56-year-old gentleman history of atrial flutter and recent cardioversion done on February 7, 2022 with conversion to sinus rhythm with first-degree AV block who presents for follow-up in EP clinic post successful cardioversion.    #Atrial flutter s/p successful cardioversion- can discontinue Metoprolol as has no other indication for it  [id number] 0-no need to continue coumadin from arrhythmia perspective as [id number] is 0 and he has completed 1 month of anticoagulation post cardioversion  -Follow-up in 3-4 months in person with EKG to be done in clinic at that visit to assess for recurrence of atrial flutter  -If flutter recurs, can consider ablation given that flutter is typical    Discussed with MD MD Omari Jones MD  Fellow Physician PGY-4  Division of Cardiovascular Disease      I have seen and interviewed patient. I have reviewed the laboratory tests, imaging, and other investigations. I have reviewed the management plan with the patient. I discussed with the team and agree with the findings and plan in this resident/fellow/nurse practitioner's note. In addition, assessment and plan have been incorporated into the note by myself, as to make it a single cohesive document.         Please do not hesitate to contact me if you have any questions/concerns.     Sincerely,     Gabriela Garcia MD     ambulatory

## 2022-03-07 NOTE — PATIENT INSTRUCTIONS
Thank you for coming to the [facility] @ [place]; please note the following instructions:    1. Stop Warfarin & Metoprolol  2. In-person visit with Dr. Garcia in 3 months        If you have any questions regarding your visit please contact your care team:     Cardiology  Telephone Number   Alicia CONKLIN, RN  Lisa LY,RN  Mena LANCASTER, COOKIE VILLARREAL, MA  Tony ZIMMERMAN, LPN   [phone]   (select option 1)    *After hours: [phone]     For scheduling appts:     [phone] or    [phone] (select option 1)    *After hours: [phone]     For the Device Clinic (Pacemakers and ICD's)  RN's :  Alexandra Bullard   During business hours: [phone]    *After business hours:  [phone] (select option 4)      Normal test result notifications will be released via [employer] or mailed within 7 business days.  All other test results, will be communicated via telephone once reviewed by your cardiologist.    If you need a medication refill please contact your pharmacy.  Please allow 3 business days for your refill to be completed.    As always, thank you for trusting us with your health care needs!

## 2022-03-07 NOTE — PROGRESS NOTES
"Cruz is a 56 year old who is being evaluated via a billable video visit.      How would you like to obtain your AVS? Mail a copy  If the video visit is dropped, the invitation should be resent by: Text to cell phone: [phone]  Will anyone else be joining your video visit? No  {If patient encounters technical issues they should call [phone] :301160}    Video Start Time: {video visit start/end time for provider to select:152948}  Video-Visit Details    Type of service:  Video Visit    Video End Time:{video visit start/end time for provider to select:152948}    Originating Location (pt. Location): {video visit patient location:246750::\"Home\"}    Distant Location (provider location):  [facility]     Platform used for Video Visit: {Virtual Visit Platforms:357970::\"Octonot[employer]Well\"}    "

## 2022-03-07 NOTE — NURSING NOTE
"  Reviewed Med list    Completed AVS    Follow-up orders placed    Marked chart \"Ready for Checkout\"      Jannet Hines RN on 3/7/2022 at 3:48 PM      "

## 2022-06-13 ENCOUNTER — OFFICE VISIT (OUTPATIENT)
Dept: CARDIOLOGY | Facility: CLINIC | Age: 57
End: 2022-06-13
Payer: MEDICARE

## 2022-06-13 VITALS
SYSTOLIC BLOOD PRESSURE: 145 MMHG | DIASTOLIC BLOOD PRESSURE: 84 MMHG | WEIGHT: 315 LBS | OXYGEN SATURATION: 95 % | HEART RATE: 81 BPM

## 2022-06-13 DIAGNOSIS — I48.3 TYPICAL ATRIAL FLUTTER (H): Primary | ICD-10-CM

## 2022-06-13 PROCEDURE — 93000 ELECTROCARDIOGRAM COMPLETE: CPT | Performed by: INTERNAL MEDICINE

## 2022-06-13 PROCEDURE — 99213 OFFICE O/P EST LOW 20 MIN: CPT | Mod: GC | Performed by: INTERNAL MEDICINE

## 2022-06-13 RX ORDER — METOPROLOL SUCCINATE 25 MG/1
25 TABLET, EXTENDED RELEASE ORAL DAILY
COMMUNITY
Start: 2022-04-20

## 2022-06-13 NOTE — LETTER
6/13/2022      RE: Cruz Patel  [address]  [facility] [address]       Dear Colleague,    Thank you for the opportunity to participate in the care of your patient, Cruz Patel, at the [facility] HEART CLINIC [facility]. Please see a copy of my visit note below.    June 13, 2022    HPI  56-year-old gentleman without any known cardiovascular disease and risk factors such as hypertension, diabetes, known coronary heart disease, and previous stroke.      He has a BMI greater than 50, nonalcoholic fatty liver disease, and metabolic syndrome.     After his ziopatch  revealed that he was in atrial flutter all the time he was referred for cardioversion which was done on 2/7/2022. It was successful and, post cardioversion, he converted to sinus rhythm with 1st degree AV block. His last visit with Dr Garcia was in March of this year, during which his  EKG done on 2/22/2022 demonstrates that he remains in sinus rhytym with 1st degree AV block. Metoprolol was discontinued at that visit.     He presents today for follow-up on his dysrhythmia.     EKG done in clinic today with sinus rhythm and 1st degree AV block.      He does not report any exertional angina, frequent lightheadedness, presyncope or syncope.    PAST MEDICAL HISTORY:  No past medical history on file.    FAMILY HISTORY:  No family history on file.    SOCIAL HISTORY:  Social History     Socioeconomic History     Marital status: Single   Tobacco Use     Smoking status: Never Smoker     Smokeless tobacco: Current User     Types: Chew   Substance and Sexual Activity     Alcohol use: Yes       CURRENT MEDICATIONS:  Current Outpatient Medications   Medication Sig Dispense Refill     furosemide (LASIX) 20 MG tablet Take 40 mg by mouth       metoprolol succinate ER (TOPROL XL) 25 MG 24 hr tablet Take 25 mg by mouth daily       omeprazole (PRILOSEC) 20 MG DR capsule Take 20 mg by mouth       potassium  chloride ER (KLOR-CON M) 20 MEQ CR tablet Take 20 mEq by mouth         ROS:   10 point ROS negative except HPI    EXAM:  BP (!) 145/84 (BP Location: Left arm, Patient Position: Sitting, Cuff Size: Adult Large)   Pulse 81   Wt (!) 157.7 kg (347 lb 9.6 oz)   SpO2 95%   General: appears comfortable, alert and articulate  Head: normocephalic, atraumatic  Eyes: anicteric sclera, EOMI  Neck: no adenopathy  Orophyarynx: moist mucosa, no lesions, dentition intact  Heart: regular  Lungs: clear, no rales or wheezing  Abdomen: soft, non-tender, bowel sounds present, no hepatosplenomegaly  Extremities: no clubbing, cyanosis or edema  Neurological: normal speech and affect, no gross motor deficits    Labs:  CBC RESULTS:  No results found for: WBC, RBC, HGB, HCT, MCV, MCH, MCHC, RDW, PLT    CMP RESULTS:  Lab Results   Component Value Date    POTASSIUM 4.6 02/07/2022      Assessment and Plan:   56-year-old gentleman history of atrial flutter s/p cardioversion done on February 7, 2022 with conversion to sinus rhythm with first-degree AV block who presents for follow-up in EP clinic post successful cardioversion.     #Atrial flutter s/p successful cardioversion- [id number] 0-no need to continue coumadin from arrhythmia perspective as [id number] is 0 and he has completed 1 month of anticoagulation post  cardioversion  -He may be followed annually in our clinic with an EKG to be done closer to his home and transmitted to our clinic, or transfer his care to cardiology closer to his home  -we will see patient again in 1 year by video clinic      Omari Mondragon MD  Fellow Physician. PGY-4  Division of Cardiovascular Disease    Discussed with Dr Radha MD MS    I have seen, interviewed, and examined patient. I have reviewed the laboratory tests, imaging, and other investigations. I have reviewed the management plan with the patient. I discussed with the team and agree with the findings and plan in this resident/fellow/nurse  practitioner's note. In addition, changes in the physical examination, assessment and plan have been incorporated into the note by myself, as to make it a single cohesive document.       Gabriela Garcia MD, MS  Cardiology/Cardiac EP Attending Staff

## 2022-06-13 NOTE — PATIENT INSTRUCTIONS
Thank you for coming to the [facility] @ [place]; please note the following instructions:    1. Dr. Gabriela Garcia would like you to return for a cardiac follow up in 1 year (June) video return with an EKG prior.  We will contact you regarding your appointment when the time draws closer or you may call [phone] option #1 to arrange an appointment.  Mean while, if you should have any questions or concerns regarding your heart health, please contact us.  Thank you for choosing [facility] for your care.         If you have any questions regarding your visit please contact your care team:     Cardiology  Telephone Number   Alicia HUNTER., RN  Lisa LY, RN   Mena LANCASTER, RMA  Love VILLARREAL, RMA  Carlie Arnold., DEBBIE CARR, Visit Facilitator   [phone] (option 1)   For scheduling appts:     [phone] (select option 1)       For the Device Clinic (Pacemakers and ICD's)  RN's :  Alexandra Bullard   During business hours: [phone]    *After business hours:  [phone] (select option 4)      Normal test result notifications will be released via [employer] or mailed within 7 business days.  All other test results, will be communicated via telephone once reviewed by your cardiologist.    If you need a medication refill please contact your pharmacy.  Please allow 3 business days for your refill to be completed.    As always, thank you for trusting us with your health care needs!

## 2023-10-02 ENCOUNTER — OFFICE VISIT (OUTPATIENT)
Dept: CARDIOLOGY | Facility: CLINIC | Age: 58
End: 2023-10-02
Attending: INTERNAL MEDICINE
Payer: MEDICARE

## 2023-10-02 VITALS
OXYGEN SATURATION: 99 % | HEART RATE: 66 BPM | SYSTOLIC BLOOD PRESSURE: 132 MMHG | WEIGHT: 315 LBS | DIASTOLIC BLOOD PRESSURE: 94 MMHG

## 2023-10-02 DIAGNOSIS — I48.3 TYPICAL ATRIAL FLUTTER (H): Primary | ICD-10-CM

## 2023-10-02 PROCEDURE — 99214 OFFICE O/P EST MOD 30 MIN: CPT | Performed by: INTERNAL MEDICINE

## 2023-10-02 PROCEDURE — 93000 ELECTROCARDIOGRAM COMPLETE: CPT | Performed by: INTERNAL MEDICINE

## 2023-10-02 NOTE — NURSING NOTE
Med Reconcile: Reviewed and verified all current medications with the patient. The updated medication list was printed and given to the patient.  Return Appointment: Patient given instructions regarding scheduling next clinic visit. Patient demonstrated understanding of this information and agreed to call with further questions or concerns.  EP Procedure: Patient given instructions regarding  cardioversion Discussed purpose, preparation, and procedure with the patient. Patient demonstrated understanding of this information and agreed to call with further questions or concerns.    Discussed with patient regarding anticoagulation. Patient had to leave clinic but is agreeable to start anticoagulation and get scheduled for JASSON/DCC  pt did give a verbal consent that a detailed message can be left on patients vm     Writer contacted pharmacy and confirmed that the pharmacy does not have any insurance on patient on file.  Writer attempted to contact patient, vm left to call clinic back regarding insurance medication coverage and to call back to discuss options.  Telephone encounter initiated.    Patient stated he understood all health information given and agreed to call with further questions or concerns.    Alicia Haynes RN  Cardiology Care Coordinator  [facility]  [phone] option 1

## 2023-10-02 NOTE — PATIENT INSTRUCTIONS
"Thank you for coming to the [facility] @ [place]; please note the following instructions:    1. Start a \"blood thinner\".  We will determine if one of the blood thinners that do not need monitoring is covered.  If not affordable then you will have to restart coumadin.  We will communicate this with you over the phone    2.  Dr. Garcia has ordered for you to have a Cardioversion with Transesophageal Echocardiogram at the [facility] ([address], [facility] [address], [phone]).  You will also need an echocardiogram to reassess your heart pumping function    3.  Follow-up in clinic in 2 months with an EKG       Visitor Policy: Two visitors.    Follow these instructions:    1. Report to the GOLD waiting room in the [facility] ([address], [facility] [address], [phone])   on: __________    2. Please do not eat anything for 8 hours prior to your procedure. You may have sips of water up until 2 hours prior to your arrival.    3. The morning of your procedure you may take your scheduled medications with a SIP of water. If you take diabetic medications or a diuretic, you may hold these.     4. You will receive medication that makes you sleepy; you will need a .    You should not make any legal decisions for 24 hours following discharge.       If you have further questions, please utilize [employer] to contact us.   If your question concerns the above instructions, contact:  Alicia Haynes RN  Electrophysiology Nurse Coordinator.  [phone]    If your question concerns the schedule/appointment times, contact:  [facility]   [phone]      If you have any questions regarding your visit please contact your care team:     Cardiology  Telephone Number   Alicia HUNTER., HI BAH, RN  Mena LANCASTER, COOKIE VILLARREAL, COOKIE CARR, Visit Facilitator  Pauline HORNER [phone] (option 1)   For scheduling appts:     " [phone] (select option 1)       For the Device Clinic (Pacemakers and ICD's)  RN's :  Alexandra Bullard   During business hours: [phone]    *After business hours:  [phone] (select option 4)      Normal test result notifications will be released via [employer] or mailed within 7 business days.  All other test results, will be communicated via telephone once reviewed by your cardiologist.    If you need a medication refill please contact your pharmacy.  Please allow 3 business days for your refill to be completed.    As always, thank you for trusting us with your health care needs!

## 2023-10-02 NOTE — NURSING NOTE
Chief Complaint   Patient presents with    RECHECK     Return EP for Typical atrial flutter. Patient reports GORMAN.       Initial BP (!) 142/92 (BP Location: Left arm, Patient Position: Chair, Cuff Size: Adult Large)   Pulse 73   Wt 144.7 kg (319 lb)   SpO2 99%  There is no height or weight on file to calculate BMI..  BP completed using cuff size: large    COOKIE Cabrera

## 2023-10-02 NOTE — PROGRESS NOTES
HPI: Purpose of visit: Follow-up of atrial flutter    57-year-old gentleman without any known cardiovascular disease and risk factors such as hypertension, diabetes, known coronary heart disease, and previous stroke.      He has a BMI greater than 50, nonalcoholic fatty liver disease, and metabolic syndrome.    Patient's last visit with me was in the summer 2022.  Patient underwent successful electrical cardioversion to normal sinus rhythm.  In recent weeks, patient has noticed some exertional dyspnea with climbing a flight of stairs.  He did not report any symptoms of palpitations, irregular heartbeat sensation, exertional angina, frequent lightheadedness, presyncope or syncope.    A twelve-lead ECG today shows typical atrial flutter with an average ventricular rate of 70 bpm.    PAST MEDICAL HISTORY:  History reviewed. No pertinent past medical history.    CURRENT MEDICATIONS:  Current Outpatient Medications   Medication Sig Dispense Refill    apixaban ANTICOAGULANT (ELIQUIS ANTICOAGULANT) 5 MG tablet Take 1 tablet (5 mg) by mouth 2 times daily 180 tablet 1    furosemide (LASIX) 20 MG tablet Take 40 mg by mouth      metoprolol succinate ER (TOPROL XL) 25 MG 24 hr tablet Take 25 mg by mouth daily      omeprazole (PRILOSEC) 20 MG DR capsule Take 20 mg by mouth      potassium chloride ER (KLOR-CON M) 20 MEQ CR tablet Take 20 mEq by mouth         PAST SURGICAL HISTORY:  Past Surgical History:   Procedure Laterality Date    ANESTHESIA CARDIOVERSION N/A 2/7/2022    Procedure: ANESTHESIA, FOR CARDIOVERSION;  Surgeon: GENERIC ANESTHESIA PROVIDER;  Location:  OR       ALLERGIES:   No Known Allergies    FAMILY HISTORY:  - Premature coronary artery disease  - Atrial fibrillation  - Sudden cardiac death     SOCIAL HISTORY:  Social History     Tobacco Use    Smoking status: Never    Smokeless tobacco: Current     Types: Chew   Substance Use Topics    Alcohol use: Yes       ROS:   Constitutional: No fever, chills, or sweats.  Weight stable.   ENT: No visual disturbance, ear ache, epistaxis, sore throat.   Cardiovascular: As per HPI.   Respiratory: No cough, hemoptysis.    GI: No nausea, vomiting, hematemesis, melena, or hematochezia.   : No hematuria.   Integument: Negative.   Psychiatric: Negative.   Hematologic:  Easy bruising, no easy bleeding.  Neuro: Negative.   Endocrinology: No significant heat or cold intolerance   Musculoskeletal: No myalgia.    Exam:  BP (!) 142/92 (BP Location: Left arm, Patient Position: Chair, Cuff Size: Adult Large)   Pulse 73   Wt 144.7 kg (319 lb)   SpO2 99%   GENERAL APPEARANCE: healthy, alert and no distress  HEENT: no icterus, no xanthelasmas, normal pupil size and reaction, normal palate, mucosa moist, no central cyanosis  NECK: no adenopathy, no asymmetry, masses, or scars, thyroid normal to palpation and no bruits, JVP not elevated  RESPIRATORY: lungs clear to auscultation - no rales, rhonchi or wheezes, no use of accessory muscles, no retractions, respirations are unlabored, normal respiratory rate  CARDIOVASCULAR: regular rhythm, normal S1 with physiologic split S2, no S3 or S4 and no murmur, click or rub, precordium quiet with normal PMI.  ABDOMEN: soft, non tender, without hepatosplenomegaly, no masses palpable, bowel sounds normal, aorta not enlarged by palpation, no abdominal bruits  EXTREMITIES: peripheral pulses normal, no edema, no bruits  NEURO: alert and oriented to person/place/time, normal speech, gait and affect  VASC: Radial, femoral, dorsalis pedis and posterior tibialis pulses are normal in volumes and symmetric bilaterally. No bruits are heard.  SKIN: no ecchymoses, no rashes    Labs:  CBC RESULTS:   No results found for: WBC, RBC, HGB, HCT, MCV, MCH, MCHC, RDW, PLT    BMP RESULTS:  Lab Results   Component Value Date    POTASSIUM 4.6 02/07/2022        INR RESULTS:  Lab Results   Component Value Date    INR 2.25 (H) 02/07/2022    INR 1.73 (H) 02/01/2022        Procedures:      Assessment and Plan:     Typical atrial flutter    I discussed with patient the aims, risks, and alternatives to electrical cardioversion to convert him to normal sinus rhythm.  We will start him on an anticoagulant and plan for a JASSON guided cardioversion in the next few weeks.  If patient starts Coumadin, his INR will need to be therapeutic on the day of the electrical cardioversion.  We will obtain a limited echocardiogram after his converted to sinus rhythm to assess his left ventricular ejection fraction.  We will see patient again in person clinic about 4 to 6 weeks after the cardioversion.    All questions and concerns were addressed and patient was happy with the plan.      CC  Patient Care Team:  Calvin Mcgrath MD as PCP - General (Family Medicine)  Gabriela Tellez MD as Assigned Heart and Vascular Provider  GABRIELA TELLEZ

## 2023-10-02 NOTE — LETTER
10/2/2023      RE: Cruz Patel  [address]  [facility] [address]       Dear Colleague,    Thank you for the opportunity to participate in the care of your patient, Cruz Patel, at the [facility] HEART [facility]. Please see a copy of my visit note below.    HPI: Purpose of visit: Follow-up of atrial flutter    57-year-old gentleman without any known cardiovascular disease and risk factors such as hypertension, diabetes, known coronary heart disease, and previous stroke.      He has a BMI greater than 50, nonalcoholic fatty liver disease, and metabolic syndrome.    Patient's last visit with me was in the summer 2022.  Patient underwent successful electrical cardioversion to normal sinus rhythm.  In recent weeks, patient has noticed some exertional dyspnea with climbing a flight of stairs.  He did not report any symptoms of palpitations, irregular heartbeat sensation, exertional angina, frequent lightheadedness, presyncope or syncope.    A twelve-lead ECG today shows typical atrial flutter with an average ventricular rate of 70 bpm.    PAST MEDICAL HISTORY:  History reviewed. No pertinent past medical history.    CURRENT MEDICATIONS:  Current Outpatient Medications   Medication Sig Dispense Refill    apixaban ANTICOAGULANT (ELIQUIS ANTICOAGULANT) 5 MG tablet Take 1 tablet (5 mg) by mouth 2 times daily 180 tablet 1    furosemide (LASIX) 20 MG tablet Take 40 mg by mouth      metoprolol succinate ER (TOPROL XL) 25 MG 24 hr tablet Take 25 mg by mouth daily      omeprazole (PRILOSEC) 20 MG DR capsule Take 20 mg by mouth      potassium chloride ER (KLOR-CON M) 20 MEQ CR tablet Take 20 mEq by mouth         PAST SURGICAL HISTORY:  Past Surgical History:   Procedure Laterality Date    ANESTHESIA CARDIOVERSION N/A 2/7/2022    Procedure: ANESTHESIA, FOR CARDIOVERSION;  Surgeon: GENERIC ANESTHESIA PROVIDER;  Location: [facility] OR       ALLERGIES:   No Known  Allergies    FAMILY HISTORY:  - Premature coronary artery disease  - Atrial fibrillation  - Sudden cardiac death     SOCIAL HISTORY:  Social History     Tobacco Use    Smoking status: Never    Smokeless tobacco: Current     Types: Chew   Substance Use Topics    Alcohol use: Yes       ROS:   Constitutional: No fever, chills, or sweats. Weight stable.   ENT: No visual disturbance, ear ache, epistaxis, sore throat.   Cardiovascular: As per HPI.   Respiratory: No cough, hemoptysis.    GI: No nausea, vomiting, hematemesis, melena, or hematochezia.   : No hematuria.   Integument: Negative.   Psychiatric: Negative.   Hematologic:  Easy bruising, no easy bleeding.  Neuro: Negative.   Endocrinology: No significant heat or cold intolerance   Musculoskeletal: No myalgia.    Exam:  BP (!) 142/92 (BP Location: Left arm, Patient Position: Chair, Cuff Size: Adult Large)   Pulse 73   Wt 144.7 kg (319 lb)   SpO2 99%   GENERAL APPEARANCE: healthy, alert and no distress  HEENT: no icterus, no xanthelasmas, normal pupil size and reaction, normal palate, mucosa moist, no central cyanosis  NECK: no adenopathy, no asymmetry, masses, or scars, thyroid normal to palpation and no bruits, JVP not elevated  RESPIRATORY: lungs clear to auscultation - no rales, rhonchi or wheezes, no use of accessory muscles, no retractions, respirations are unlabored, normal respiratory rate  CARDIOVASCULAR: regular rhythm, normal S1 with physiologic split S2, no S3 or S4 and no murmur, click or rub, precordium quiet with normal PMI.  ABDOMEN: soft, non tender, without hepatosplenomegaly, no masses palpable, bowel sounds normal, aorta not enlarged by palpation, no abdominal bruits  EXTREMITIES: peripheral pulses normal, no edema, no bruits  NEURO: alert and oriented to person/place/time, normal speech, gait and affect  VASC: Radial, femoral, dorsalis pedis and posterior tibialis pulses are normal in volumes and symmetric bilaterally. No bruits are  heard.  SKIN: no ecchymoses, no rashes    Labs:  CBC RESULTS:   No results found for: WBC, RBC, HGB, HCT, MCV, MCH, MCHC, RDW, PLT    BMP RESULTS:  Lab Results   Component Value Date    POTASSIUM 4.6 02/07/2022     INR RESULTS:  Lab Results   Component Value Date    INR 2.25 (H) 02/07/2022    INR 1.73 (H) 02/01/2022     Procedures:      Assessment and Plan:     Typical atrial flutter    I discussed with patient the aims, risks, and alternatives to electrical cardioversion to convert him to normal sinus rhythm.  We will start him on an anticoagulant and plan for a JASSON guided cardioversion in the next few weeks.  If patient starts Coumadin, his INR will need to be therapeutic on the day of the electrical cardioversion.  We will obtain a limited echocardiogram after his converted to sinus rhythm to assess his left ventricular ejection fraction.  We will see patient again in person clinic about 4 to 6 weeks after the cardioversion.    All questions and concerns were addressed and patient was happy with the plan.      Please do not hesitate to contact me if you have any questions/concerns.     Sincerely,     Gabriela Garcia MD

## 2023-10-03 ENCOUNTER — ANTICOAGULATION THERAPY VISIT (OUTPATIENT)
Dept: ANTICOAGULATION | Facility: CLINIC | Age: 58
End: 2023-10-03
Payer: MEDICARE

## 2023-10-03 ENCOUNTER — TELEPHONE (OUTPATIENT)
Dept: CARDIOLOGY | Facility: CLINIC | Age: 58
End: 2023-10-03

## 2023-10-03 DIAGNOSIS — I48.3 TYPICAL ATRIAL FLUTTER (H): Primary | ICD-10-CM

## 2023-10-03 RX ORDER — POTASSIUM CHLORIDE 1500 MG/1
20 TABLET, EXTENDED RELEASE ORAL
Status: CANCELLED | OUTPATIENT
Start: 2023-10-03

## 2023-10-03 RX ORDER — WARFARIN SODIUM 5 MG/1
TABLET ORAL
Qty: 30 TABLET | Refills: 0 | Status: SHIPPED | OUTPATIENT
Start: 2023-10-03 | End: 2023-11-07

## 2023-10-03 RX ORDER — POTASSIUM CHLORIDE 1500 MG/1
40 TABLET, EXTENDED RELEASE ORAL
Status: CANCELLED | OUTPATIENT
Start: 2023-10-03

## 2023-10-03 RX ORDER — LIDOCAINE 40 MG/G
CREAM TOPICAL
Status: CANCELLED | OUTPATIENT
Start: 2023-10-03

## 2023-10-03 NOTE — PROGRESS NOTES
ANTICOAGULATION  MANAGEMENT    Cruz Patel is being discharged from the [facility]tico[facility] (ACC).    Reason for discharge:  Patient being followed by [facility] in [place]    Anticoagulation episode resolved and ACC referral closed    If patient needs warfarin management in the future, please send a new referral    Left message for [facility] to [facility] at [phone]    Daria Moya RN

## 2023-10-03 NOTE — TELEPHONE ENCOUNTER
Spoke to patient.  Eliquis is too expensive.  Patient pays out of pocket.  Patient is agreeable to start coumadin.      Date: 10/3/2023    Time of Call: 3:15 PM     Diagnosis:  afib-needs a JASSON/DCC approx 1 month after initiating coumadin     [ VORB ] Ordering provider: Dr. Garcia  Order: START coumadin 5 mg daily 10/3  Check INR 10/6 and on a weekly basis and as needed  JASSON/DCC needs to be scheduled in approx 1 month.  INR needs to be therapeutic on day of cardioversion     Order received by: Alicia Haynes RN       Follow-up/additional notes: writer spoke with [facility] anticoagulation clinic nurse.  INR referral, office notes, and this encounter needs to be printed and faxed to the INR clinic at [phone] (anticoagulation phone # is [phone])

## 2023-10-04 ENCOUNTER — HOSPITAL ENCOUNTER (OUTPATIENT)
Facility: CLINIC | Age: 58
End: 2023-10-04
Payer: MEDICARE

## 2023-10-05 NOTE — TELEPHONE ENCOUNTER
Spoke to patient.  Patient states he started warfarin and his appt is scheduled tomorrow at 10 am.      Reviewed with him that the JASSON/cardioversion and echo is scheduled for 10/31 and went over instructions.  Patient is agreeable with the plan and verbalized understanding.    Writer will keep this encounter open to check weekly INRs via careverywhere.    Alicia Haynes RN  Cardiology Care Coordinator  [facility]  [phone] option 1

## 2023-10-05 NOTE — TELEPHONE ENCOUNTER
Health Call Center    Phone Message    May a detailed message be left on voicemail: yes     Reason for Call: Medication Question or concern regarding medication   Prescription Clarification  Name of Medication: Eliquis  Prescribing Provider: Dr Garcia   Pharmacy: [id number] [place] [facility] [address] [place] [address]     What on the order needs clarification? The patient asked if Alicia the RN would call him back. The patient said he returning a call      Action Taken: Other: Cardiology    Travel Screening: Not Applicable    Thank you!  Specialty Access Center

## 2023-10-09 NOTE — TELEPHONE ENCOUNTER
10/6 INR 1.1    Encounter postponed to follow labs.  Pt getting ready for JASSON/DCC.    Alicia Haynes, RN  Cardiology Care Coordinator  [facility]  [phone] option 1

## 2023-10-16 NOTE — TELEPHONE ENCOUNTER
10/13   INR 2.6    Alicia Haynes, RN  Cardiology Care Coordinator  [facility]  [phone] option 1

## 2023-10-30 DIAGNOSIS — I48.0 PAF (PAROXYSMAL ATRIAL FIBRILLATION) (H): Primary | ICD-10-CM

## 2023-10-30 NOTE — TELEPHONE ENCOUNTER
"10/27 1.8    Spoke to patient.  Patient states that he would prefer to cancel his cardioversion tomorrow and wants to wait a couple of weeks so his INR can become \"more stable\".      This was communicated with Dr. Garcia and Dr. Garcia is agreeable with the plan.    Message sent to Shaunna,  and Martha Warren as fyi.    Writer will continue to monitor INR results.    Alicia Haynes, RN  Cardiology Care Coordinator  [facility]  [phone] option 1    "

## 2023-10-31 DIAGNOSIS — I48.3 TYPICAL ATRIAL FLUTTER (H): ICD-10-CM

## 2023-11-06 NOTE — TELEPHONE ENCOUNTER
11/3/23 INR 2.4  Alicia Haynes, RN  Cardiology Care Coordinator  [facility]  [phone] option 1

## 2023-11-07 RX ORDER — WARFARIN SODIUM 5 MG/1
TABLET ORAL
Start: 2023-11-07 | End: 2024-07-22

## 2023-11-07 NOTE — TELEPHONE ENCOUNTER
Rx filled by primary care where INR is being monitored.  Documentation in careeverywere showing they are refiling the warfarin.  Alicia Haynes RN  Cardiology Care Coordinator  [facility]  [phone] option 1

## 2023-11-21 NOTE — TELEPHONE ENCOUNTER
INR supra.  Today 4.7, yesterday 4.0    Dr. Garcia aware and advised for the cardioversion to be cancelled and rescheduled.    Patient is aware and is agreeable with the plan.  Push out at least 2 weeks.  Shaunna,  will be call patient.  Patient verbalized understanding.    Alicia Haynes RN  Cardiology Care Coordinator  [facility]  [phone] option 1

## 2023-11-24 ENCOUNTER — TELEPHONE (OUTPATIENT)
Dept: CARDIOLOGY | Facility: CLINIC | Age: 58
End: 2023-11-24

## 2023-11-24 NOTE — TELEPHONE ENCOUNTER
Called and spoke with Sarah with [facility]. They were wondering if there were specific instructions for INR so that patient does need to have cardioversion cancelled again. Informed patient that procedure is going to be pushed out for about 2 weeks. Cardioversion currently not scheduled. Informed patient that provider would be updated to verify if there are any further instructions for INR management.    Eri Deal, RN, BSN  Cardiology RN Care Coordinator   [place]   Phone: [phone]  Fax: [phone] ([place]) [phone] ([place])

## 2023-11-24 NOTE — TELEPHONE ENCOUNTER
[facility] Call Center    Phone Message    May a detailed message be left on voicemail: yes     Reason for Call: Other: Sarah from [facility] was calling and looking to speak to a nurse. PT's cardioversion was cancelled and she is looking for further instructions regarding INR to get this cardioversion r/s. Please call Sarah back at [phone].     Action Taken: Other: Cardiology    Travel Screening: Not Applicable

## 2023-11-28 NOTE — TELEPHONE ENCOUNTER
Spoke to RN INR clinic and explained that the previous DCC was cancelled due to INR supra at 4.8.  his goal is 2.0-3.0.  INR to be drawn day prior to the cardioversion (which is not rescheduled at this time) and prior to patient traveling to the city for the procedure.  RN will notify INR clinic on the date of the cardioversion so they are aware when it is rescheduled for.  Nurse verbalized understanding.    Most recent INR 11/24 was 3.2.  will keep this encounter open to track his INR results.    Alicia Haynes RN  Cardiology Care Coordinator  [facility]  [phone] option 1

## 2023-11-29 NOTE — TELEPHONE ENCOUNTER
11/28/23 >4.0    Noted-pharmacist making a consideration to look into a DOAC with 1 month voucher (minimal medication coverage) in place of warfarin prior to the cardioversion to avoid cancellation.    Alicia Haynes RN  Cardiology Care Coordinator  [facility]  [phone] option 1

## 2023-12-06 NOTE — TELEPHONE ENCOUNTER
Spoke to patient.  Patient prefers to continue coumadin at this time and we will reassess on MOnday 12/11.  If INR still not in range he may decide to start the DOAC-writer explained that after the month supply is complete he will need to restart coumadin so it can be tricky.  Will review with Dr. Garcia regarding this situation if INRs are unstable.    Alicia Haynes RN  Cardiology Care Coordinator  [facility]  [phone] option 1

## 2023-12-06 NOTE — TELEPHONE ENCOUNTER
Noted that pharmacist offered patient a rx for DOAC in place of warfarin.  At this time documentation shows that the patient is still wanting to continue warfarin at this time.  Next INR 12/8.  Will follow-up then and then discuss next steps.  Alicia Haynes RN  Cardiology Care Coordinator  [facility]  [phone] option 1

## 2023-12-12 NOTE — TELEPHONE ENCOUNTER
INRs are going in the right direction today 12/12 2.8.  writer spoke with patient.  Pt decides he wants to continue warfarin but would prefer DCC to be scheduled first week of Jan.  Writer explained that we can continue this plan.  Will review next week again    Alicia Haynes RN  Cardiology Care Coordinator  [facility]  [phone] option 1

## 2023-12-19 NOTE — TELEPHONE ENCOUNTER
12/15 2.9    12/18     It is time to get him scheduled for his cardioversion.    Alicia Haynes, RN  Cardiology Care Coordinator  [facility]  [phone] option 1

## 2023-12-21 NOTE — TELEPHONE ENCOUNTER
CTA heart, DCC, Echo scheduled for 1/5/24.  Writer contacted INR clinic in [place] and explained that an INR needs to be drawn 1/4 one day prior to his procedure.  Patient is aware of this.  Writer will contact patient 1/2 to review INRs and pre-procedure instructions.    Alicia Haynes RN  Cardiology Care Coordinator  [facility]  [phone] option 1        12/18 2.6  12/15 2.9  12/12 2.8  12/8 3.5  12/4 3.8

## 2024-01-03 NOTE — TELEPHONE ENCOUNTER
12/22 3.0  12/26 2.4  12/29 2.2  1/2 2.1    1/4 NEXT INR    1/5  scheduled DCC    Alicia Haynes, RN  Cardiology Care Coordinator  [facility]  [phone] option 1

## 2024-01-04 ENCOUNTER — TELEPHONE (OUTPATIENT)
Dept: CARDIOLOGY | Facility: CLINIC | Age: 59
End: 2024-01-04
Payer: MEDICARE

## 2024-01-04 NOTE — TELEPHONE ENCOUNTER
Spoke to patient and discussed INR is therapeutic ok to go forward with tomorrow procedure.  Pre-procedure Instructions reviewed with patient-nothing to eat 8 hours prior and nothing to drink 2 hours prior to the check in time.  Patient verbalized understanding.      Alicia Haynes RN  Cardiology Care Coordinator  [facility]  [phone] option 1

## 2024-01-04 NOTE — TELEPHONE ENCOUNTER
[facility] Call Center    Phone Message    May a detailed message be left on voicemail: yes     Reason for Call: Other: pt called back wanting to speak with Alicia     Action Taken: Other: cardiology    Travel Screening: Not Applicable    Thank you!  Specialty Access Center

## 2024-01-05 ENCOUNTER — HOSPITAL ENCOUNTER (OUTPATIENT)
Dept: CARDIOLOGY | Facility: CLINIC | Age: 59
Discharge: HOME | End: 2024-01-05
Attending: INTERNAL MEDICINE
Payer: MEDICARE

## 2024-01-05 ENCOUNTER — LAB (OUTPATIENT)
Dept: LAB | Facility: CLINIC | Age: 59
End: 2024-01-05
Payer: MEDICARE

## 2024-01-05 ENCOUNTER — ANESTHESIA (OUTPATIENT)
Dept: SURGERY | Facility: CLINIC | Age: 59
End: 2024-01-05
Payer: MEDICARE

## 2024-01-05 ENCOUNTER — HOSPITAL ENCOUNTER (OUTPATIENT)
Facility: CLINIC | Age: 59
Discharge: HOME | End: 2024-01-05
Attending: INTERNAL MEDICINE | Admitting: INTERNAL MEDICINE
Payer: MEDICARE

## 2024-01-05 ENCOUNTER — ANESTHESIA EVENT (OUTPATIENT)
Dept: SURGERY | Facility: CLINIC | Age: 59
End: 2024-01-05
Payer: MEDICARE

## 2024-01-05 ENCOUNTER — HOSPITAL ENCOUNTER (OUTPATIENT)
Dept: MEDSURG UNIT | Facility: CLINIC | Age: 59
Discharge: HOME | End: 2024-01-05
Payer: MEDICARE

## 2024-01-05 VITALS
RESPIRATION RATE: 16 BRPM | DIASTOLIC BLOOD PRESSURE: 96 MMHG | SYSTOLIC BLOOD PRESSURE: 149 MMHG | HEART RATE: 79 BPM | OXYGEN SATURATION: 97 %

## 2024-01-05 DIAGNOSIS — I48.3 TYPICAL ATRIAL FLUTTER (H): ICD-10-CM

## 2024-01-05 LAB
HOLD SPECIMEN: NORMAL
INR PPP: 2.06 (ref 0.85–1.15)
LVEF ECHO: NORMAL
MAGNESIUM SERPL-MCNC: 2 MG/DL (ref 1.7–2.3)
POTASSIUM SERPL-SCNC: 4.7 MMOL/L (ref 3.4–5.3)

## 2024-01-05 PROCEDURE — 85610 PROTHROMBIN TIME: CPT | Performed by: INTERNAL MEDICINE

## 2024-01-05 PROCEDURE — 93005 ELECTROCARDIOGRAM TRACING: CPT

## 2024-01-05 PROCEDURE — 93308 TTE F-UP OR LMTD: CPT | Mod: 26 | Performed by: INTERNAL MEDICINE

## 2024-01-05 PROCEDURE — 83735 ASSAY OF MAGNESIUM: CPT | Performed by: INTERNAL MEDICINE

## 2024-01-05 PROCEDURE — 370N000017 HC ANESTHESIA TECHNICAL FEE, PER MIN

## 2024-01-05 PROCEDURE — 93010 ELECTROCARDIOGRAM REPORT: CPT | Performed by: INTERNAL MEDICINE

## 2024-01-05 PROCEDURE — 250N000009 HC RX 250: Performed by: NURSE ANESTHETIST, CERTIFIED REGISTERED

## 2024-01-05 PROCEDURE — 999N000054 HC STATISTIC EKG NON-CHARGEABLE

## 2024-01-05 PROCEDURE — 92960 CARDIOVERSION ELECTRIC EXT: CPT

## 2024-01-05 PROCEDURE — 93321 DOPPLER ECHO F-UP/LMTD STD: CPT | Mod: 26 | Performed by: INTERNAL MEDICINE

## 2024-01-05 PROCEDURE — 84132 ASSAY OF SERUM POTASSIUM: CPT | Performed by: INTERNAL MEDICINE

## 2024-01-05 PROCEDURE — 255N000002 HC RX 255 OP 636: Performed by: INTERNAL MEDICINE

## 2024-01-05 PROCEDURE — 93325 DOPPLER ECHO COLOR FLOW MAPG: CPT | Mod: 26 | Performed by: INTERNAL MEDICINE

## 2024-01-05 PROCEDURE — 93325 DOPPLER ECHO COLOR FLOW MAPG: CPT

## 2024-01-05 RX ORDER — METHOHEXITAL IN WATER/PF 100MG/10ML
SYRINGE (ML) INTRAVENOUS PRN
Status: DISCONTINUED | OUTPATIENT
Start: 2024-01-05 | End: 2024-01-05

## 2024-01-05 RX ORDER — POTASSIUM CHLORIDE 750 MG/1
40 TABLET, EXTENDED RELEASE ORAL
Status: DISCONTINUED | OUTPATIENT
Start: 2024-01-05 | End: 2024-01-06 | Stop reason: HOSPADM

## 2024-01-05 RX ORDER — LIDOCAINE 40 MG/G
CREAM TOPICAL
Status: DISCONTINUED | OUTPATIENT
Start: 2024-01-05 | End: 2024-01-06 | Stop reason: HOSPADM

## 2024-01-05 RX ORDER — POTASSIUM CHLORIDE 750 MG/1
20 TABLET, EXTENDED RELEASE ORAL
Status: DISCONTINUED | OUTPATIENT
Start: 2024-01-05 | End: 2024-01-06 | Stop reason: HOSPADM

## 2024-01-05 RX ORDER — MAGNESIUM SULFATE HEPTAHYDRATE 40 MG/ML
2 INJECTION, SOLUTION INTRAVENOUS
Status: DISCONTINUED | OUTPATIENT
Start: 2024-01-05 | End: 2024-01-06 | Stop reason: HOSPADM

## 2024-01-05 RX ADMIN — HUMAN ALBUMIN MICROSPHERES AND PERFLUTREN 6 ML: 10; .22 INJECTION, SOLUTION INTRAVENOUS at 11:39

## 2024-01-05 RX ADMIN — Medication 90 MG: at 10:47

## 2024-01-05 ASSESSMENT — ACTIVITIES OF DAILY LIVING (ADL)
ADLS_ACUITY_SCORE: 35

## 2024-01-05 ASSESSMENT — ENCOUNTER SYMPTOMS: DYSRHYTHMIAS: 1

## 2024-01-05 NOTE — SEDATION DOCUMENTATION
Pt arrived in ECHO department for scheduled DCCV.   Procedure explained, questions answered and consent signed. Discharge instructions discussed with patient.  INR WNL so proceeded with DCCV. Anesthesia gave pt 90 mg IV brevital for sedation and pt was DCCV at 200 Joules to a SR.  Post EKG and TTE completed. Pt denied pain after procedure and was D/C home after awake and VSS. Escorted out to front lobby by staff in w/c to meet pt's ride home.  Ludy Harvey RN

## 2024-01-05 NOTE — ANESTHESIA POSTPROCEDURE EVALUATION
Patient: Cruz Patel    Procedure: Procedure(s):  Anesthesia cardioversion @1100       Anesthesia Type:  General    Note:  Disposition: Outpatient   Postop Pain Control: Uneventful            Sign Out: Well controlled pain   PONV: No   Neuro/Psych: Uneventful            Sign Out: Acceptable/Baseline neuro status   Airway/Respiratory: Uneventful            Sign Out: Acceptable/Baseline resp. status   CV/Hemodynamics: Uneventful            Sign Out: Acceptable CV status; No obvious hypovolemia; No obvious fluid overload   Other NRE: NONE   DID A NON-ROUTINE EVENT OCCUR? No           Last vitals:  There were no vitals filed for this visit.    Electronically Signed By: Isaac Portillo MD  January 5, 2024  11:20 AM

## 2024-01-05 NOTE — DISCHARGE INSTRUCTIONS
GOING HOME AFTER YOUR CARDIOVERSION    FOR NEXT 24 HOURS:  An adult should stay with you.  Relax and take it easy.  DO NOT make any important legal decisions.  DO NOT drive or operate machines at home or at work.  DO NOT consume alcohol.   Resume your regular diet and drink plenty of fluids.  If you have any redness/skin sorness where the patches were placed, you may use aloe vera gel or 1% hydrocortisone cream to the skin (sold at drug stores)  Take Tylenol (Acetaminophen) per your provider's recommendations as needed to help relieve local pain.     CALL YOUR HEALTHCARE PROVIDER IF:  You develop nausea or vomiting  You develop hives or a rash or any unexplained itching  Have irregular heartbeat or fast pulse  Feel faint, dizzy, or lightheaded  Have chest pain with increased activity  Have bleeding issues from blood-thinning medicines    CALL 911 RIGHT AWAY IF YOU HAVE:  Pain in your chest, arm, shoulder, neck, or upper back  Shortness of breath  Loss of vision, speech, or strength or coordination in any body part  Weakness in your arm or leg  Uncontrolled bleeding  Feel unstable in any way     FOLLOW UP APPOINTMENT:    Follow up as scheduled with EP/Cardiology Provider in 4 weeks    ADDITIONAL INFORMATION:  Cardiovascular Clinic:   [address] [place] [address]. [place] [address]  Your Care Team:  EP Cardiology   Telephone Number     Leyla Torres RN [phone]    After business hours: [phone], select option 4, ask for EP Fellow on call to be paged.     For scheduling appts or procedures:    Shaunna Huff   [phone]   For the Device Clinic (Pacemakers and ICD's)   RN's :   Alexandra Bullard  During business hours: [phone]     After business hours:   [phone]- select option 4 and ask for job code [id number].       As always, Thank you for trusting us with your health care needs!

## 2024-01-05 NOTE — ANESTHESIA CARE TRANSFER NOTE
Patient: Cruz aPtel    Procedure: Procedure(s):  Anesthesia cardioversion @1100       Diagnosis: Atypical atrial flutter (H) [I48.4]  Diagnosis Additional Information: No value filed.    Anesthesia Type:   No value filed.     Note:    Oropharynx: spontaneously breathing  Level of Consciousness: awake  Oxygen Supplementation: nasal cannula    Independent Airway: airway patency satisfactory and stable  Dentition: dentition unchanged  Vital Signs Stable: post-procedure vital signs reviewed and stable  Report to RN Given: handoff report given  Patient transferred to: Phase II    Handoff Report: Identifed the Patient, Identified the Reponsible Provider, Reviewed the pertinent medical history, Discussed the surgical course, Reviewed Intra-OP anesthesia mangement and issues during anesthesia, Set expectations for post-procedure period and Allowed opportunity for questions and acknowledgement of understanding      Vitals:  Vitals Value Taken Time   BP     Temp     Pulse     Resp     SpO2         Electronically Signed By: ALBERTO Pal CRNA  January 5, 2024  11:19 AM

## 2024-01-05 NOTE — ANESTHESIA PREPROCEDURE EVALUATION
"Anesthesia Pre-Procedure Evaluation    Patient: Cruz Patel   MRN: [id number] : 1965        Procedure : Procedure(s):  Anesthesia cardioversion @1100          No past medical history on file.   Past Surgical History:   Procedure Laterality Date    ANESTHESIA CARDIOVERSION N/A 2022    Procedure: ANESTHESIA, FOR CARDIOVERSION;  Surgeon: GENERIC ANESTHESIA PROVIDER;  Location: [facility] OR      No Known Allergies   Social History     Tobacco Use    Smoking status: Never    Smokeless tobacco: Current     Types: Chew   Substance Use Topics    Alcohol use: Yes      Wt Readings from Last 1 Encounters:   10/02/23 144.7 kg (319 lb)        Anesthesia Evaluation   Pt has had prior anesthetic. Type: General.        ROS/MED HX  ENT/Pulmonary:     (+) sleep apnea,                                       Neurologic:       Cardiovascular:     (+)  - -   -  - -                        dysrhythmias, a-fib,             METS/Exercise Tolerance:     Hematologic:       Musculoskeletal:       GI/Hepatic:       Renal/Genitourinary:       Endo:     (+)               Obesity,       Psychiatric/Substance Use:       Infectious Disease:       Malignancy:       Other:            Physical Exam    Airway        Mallampati: II   TM distance: > 3 FB   Neck ROM: full   Mouth opening: > 3 cm    Respiratory Devices and Support         Dental       (+) Modest Abnormalities - crowns, retainers, 1 or 2 missing teeth      Cardiovascular          Rhythm and rate: irregular and tachycardia     Pulmonary   pulmonary exam normal                OUTSIDE LABS:  CBC: No results found for: \"WBC\", \"HGB\", \"HCT\", \"PLT\"  BMP:   Lab Results   Component Value Date    POTASSIUM 4.7 2024    POTASSIUM 4.6 2022     COAGS:   Lab Results   Component Value Date    INR 2.06 (H) 2024     POC: No results found for: \"BGM\", \"HCG\", \"HCGS\"  HEPATIC: No results found for: \"ALBUMIN\", \"PROTTOTAL\", \"ALT\", \"AST\", \"GGT\", \"ALKPHOS\", \"BILITOTAL\", \"BILIDIRECT\", " "\"LIZZY\"  OTHER:   Lab Results   Component Value Date    MAG 2.0 01/05/2024       Anesthesia Plan    ASA Status:  2       Anesthesia Type: General.     - Airway: Native airway   Induction: Intravenous.   Maintenance: N/A.        Consents    Anesthesia Plan(s) and associated risks, benefits, and realistic alternatives discussed. Questions answered and patient/representative(s) expressed understanding.     - Discussed:     - Discussed with:  Patient      - Extended Intubation/Ventilatory Support Discussed: No.      - Patient is DNR/DNI Status: No     Use of blood products discussed: No .     Postoperative Care            Comments:               Isaac Portillo MD    I have reviewed the pertinent notes and labs in the chart from the past 30 days and (re)examined the patient.  Any updates or changes from those notes are reflected in this note.            # Drug Induced Coagulation Defect: home medication list includes an anticoagulant medication       "

## 2024-01-05 NOTE — H&P
Electrophysiology Pre-Procedure History and Physical    Cruz Patel MRN# [id number]   Age: 58 year old YOB: 1965      Date of Procedure: 1/5/24 [facility]      Date of Exam 1/5/2024 Facility (Same day)       HPI:  Cruz Patel is a 58 year old male with past medical history significant for atrial flutter. Initial atrial flutter diagnosis in November of 2021. He ultimately underwent cardioversion on 2/7/22. He was recently seen by Dr MINESH Gracia on 10/2/23, and reported some exertional dyspnea with climbing a flight of stairs. ECG done in clinic showed typical atrial flutter. He otherwise denied, palpitations, irregular heart rate or presyncope symptoms. Anticoagulation was initiated and JASSON/cardioversion was discussed. He was agreeable to proceeding.          Active problem list:     Patient Active Problem List    Diagnosis Date Noted    Typical atrial flutter (H) 10/03/2023     Priority: Medium            Medications (include herbals and vitamins):      Current Outpatient Medications   Medication Sig    furosemide (LASIX) 20 MG tablet Take 40 mg by mouth    metoprolol succinate ER (TOPROL XL) 25 MG 24 hr tablet Take 25 mg by mouth daily    omeprazole (PRILOSEC) 20 MG DR capsule Take 20 mg by mouth    potassium chloride ER (KLOR-CON M) 20 MEQ CR tablet Take 20 mEq by mouth    warfarin ANTICOAGULANT (COUMADIN) 5 MG tablet TAKE 1 TABLET BY MOUTH DAILY OR AS INSTRCTED BY THE INR CLINIC     No current facility-administered medications for this encounter.           Medication List         Notice    Cannot display patient medications because the patient has not yet been checked in.              Allergies:    No Known Allergies          Social History:     Social History     Tobacco Use    Smoking status: Never    Smokeless tobacco: Current     Types: Chew   Substance Use Topics    Alcohol use: Yes            Physical Exam:   All vitals have been  reviewed  No data found.  No intake/output data recorded.  Airway assessment:   Patient is able to open mouth wide  Patient is able to stick out tongue}      ENT:   normocepalic, without obvious abnormality     Neck:   supple, symmetrical, trachea midline     Lungs:   No increased work of breathing, good air exchange, clear to auscultation bilaterally, no crackles or wheezing     Cardiovascular:   normal S1 and S2 and no edema             Lab / Radiology Results:   Reviewed.           Plan:   Patient's active problems diagnostically and therapeutically optimized for the planned procedure. Patient here for cardioversion. Procedure explained in detail to patient including indications, risks, and benefits. Patient states understanding and wishes to procced.       Heidi Mo PA-C  [facility]  Electrophysiology Consult Service  Pager: [phone]

## 2024-01-05 NOTE — PROCEDURES
[facility]    Procedure: Cardioversion    Date/Time: 1/5/2024 12:39 PM    Performed by: Heidi Mo PA-C  Authorized by: Gabriela Garcia MD      UNIVERSAL PROTOCOL   Site Marked: NA  Prior Images Obtained and Reviewed:  NA  Required items: Required blood products, implants, devices and special equipment available    Patient identity confirmed:  Verbally with patient  Patient was reevaluated immediately before administering moderate or deep sedation or anesthesia  Confirmation Checklist:  Patient's identity using two indicators, procedure was appropriate and matched the consent or emergent situation, correct equipment/implants were available and relevant allergies  Time out: Immediately prior to the procedure a time out was called    Universal Protocol: the Joint Commission Universal Protocol was followed       ANESTHESIA    Anesthesia was administered and monitored by anesthesiology.  See anesthesia documentation for details.    PROCEDURE DETAILS  Pre-procedure rhythm: atrial flutter  Patient position: patient was placed in a supine position  Chest area: chest area exposed  Electrodes: pads  Electrodes placed: anterior-posterior  Number of attempts: 1    Details of Attempts:  One, 200 J biphasic synchronized shock delivered with conversion to sinus rhythm.   Post-procedure rhythm: normal sinus rhythm      PROCEDURE    Patient Tolerance:  Patient tolerated the procedure well with no immediate complications    Anticoagulation: On warfarin, INR within goal the past four weeks.       Heidi Mo PA-C  [facility]  Electrophysiology Consult Service  Pager: [phone]

## 2024-01-06 LAB
ATRIAL RATE - MUSE: 276 BPM
ATRIAL RATE - MUSE: 79 BPM
DIASTOLIC BLOOD PRESSURE - MUSE: NORMAL MMHG
DIASTOLIC BLOOD PRESSURE - MUSE: NORMAL MMHG
INTERPRETATION ECG - MUSE: NORMAL
INTERPRETATION ECG - MUSE: NORMAL
P AXIS - MUSE: 47 DEGREES
P AXIS - MUSE: 76 DEGREES
PR INTERVAL - MUSE: 304 MS
PR INTERVAL - MUSE: NORMAL MS
QRS DURATION - MUSE: 82 MS
QRS DURATION - MUSE: 88 MS
QT - MUSE: 380 MS
QT - MUSE: 392 MS
QTC - MUSE: 420 MS
QTC - MUSE: 486 MS
R AXIS - MUSE: 83 DEGREES
R AXIS - MUSE: 92 DEGREES
SYSTOLIC BLOOD PRESSURE - MUSE: NORMAL MMHG
SYSTOLIC BLOOD PRESSURE - MUSE: NORMAL MMHG
T AXIS - MUSE: 32 DEGREES
T AXIS - MUSE: 45 DEGREES
VENTRICULAR RATE- MUSE: 69 BPM
VENTRICULAR RATE- MUSE: 98 BPM

## 2024-01-08 NOTE — TELEPHONE ENCOUNTER
DCC successful.  Follow-up scheduled 1/22 in clinic.    Alicia Haynes, RN  Cardiology Care Coordinator  [employer]  [phone] option 1

## 2024-01-22 ENCOUNTER — OFFICE VISIT (OUTPATIENT)
Dept: CARDIOLOGY | Facility: CLINIC | Age: 59
End: 2024-01-22
Payer: MEDICARE

## 2024-01-22 VITALS — SYSTOLIC BLOOD PRESSURE: 139 MMHG | HEART RATE: 61 BPM | DIASTOLIC BLOOD PRESSURE: 87 MMHG | OXYGEN SATURATION: 96 %

## 2024-01-22 DIAGNOSIS — I48.3 TYPICAL ATRIAL FLUTTER (H): Primary | ICD-10-CM

## 2024-01-22 DIAGNOSIS — I48.0 PAF (PAROXYSMAL ATRIAL FIBRILLATION) (H): ICD-10-CM

## 2024-01-22 PROCEDURE — 99213 OFFICE O/P EST LOW 20 MIN: CPT | Performed by: INTERNAL MEDICINE

## 2024-01-22 PROCEDURE — 93000 ELECTROCARDIOGRAM COMPLETE: CPT | Performed by: INTERNAL MEDICINE

## 2024-01-22 NOTE — NURSING NOTE
Chief Complaint   Patient presents with    Follow Up     Reason for visit: Return EP for typical atrial flutter       Initial /87 (BP Location: Left arm, Patient Position: Sitting, Cuff Size: Adult Large)   Pulse 61   SpO2 96%  There is no height or weight on file to calculate BMI..  BP completed using cuff size: BRENDA Bryan

## 2024-01-22 NOTE — PROGRESS NOTES
HPI: Purpose of visit: Follow-up of atrial flutter    58-year-old gentleman without any known cardiovascular disease and risk factors such as hypertension, diabetes, known coronary heart disease, and previous stroke.      He has a BMI greater than 50, nonalcoholic fatty liver disease, and metabolic syndrome.    Most recently, patient underwent successful electrical cardioversion for atrial flutter on January 5, 2024.  Since the cardioversion, patient has stayed in sinus rhythm and has felt an improvement in his physical stamina.  He did not report any symptoms of palpitations, irregular heartbeat sensation, exertional dyspnea, exertional angina, frequent lightheadedness, presyncope or syncope.    PAST MEDICAL HISTORY:  History reviewed. No pertinent past medical history.    CURRENT MEDICATIONS:  Current Outpatient Medications   Medication Sig Dispense Refill    furosemide (LASIX) 20 MG tablet Take 40 mg by mouth      metoprolol succinate ER (TOPROL XL) 25 MG 24 hr tablet Take 25 mg by mouth daily      omeprazole (PRILOSEC) 20 MG DR capsule Take 20 mg by mouth      potassium chloride ER (KLOR-CON M) 20 MEQ CR tablet Take 20 mEq by mouth      warfarin ANTICOAGULANT (COUMADIN) 5 MG tablet TAKE 1 TABLET BY MOUTH DAILY OR AS INSTRCTED BY THE INR CLINIC         PAST SURGICAL HISTORY:  Past Surgical History:   Procedure Laterality Date    ANESTHESIA CARDIOVERSION N/A 2/7/2022    Procedure: ANESTHESIA, FOR CARDIOVERSION;  Surgeon: GENERIC ANESTHESIA PROVIDER;  Location: [facility] OR    ANESTHESIA CARDIOVERSION N/A 1/5/2024    Procedure: Anesthesia cardioversion @1100;  Surgeon: GENERIC ANESTHESIA PROVIDER;  [facility]       ALLERGIES:   No Known Allergies    FAMILY HISTORY:  - Premature coronary artery disease  - Atrial fibrillation  - Sudden cardiac death     SOCIAL HISTORY:  Social History     Tobacco Use    Smoking status: Never    Smokeless tobacco: Current     Types: Chew   Substance Use Topics    Alcohol use: Yes  "      ROS:   Constitutional: No fever, chills, or sweats. Weight stable.   ENT: No visual disturbance, ear ache, epistaxis, sore throat.   Cardiovascular: As per HPI.   Respiratory: No cough, hemoptysis.    GI: No nausea, vomiting, hematemesis, melena, or hematochezia.   : No hematuria.   Integument: Negative.   Psychiatric: Negative.   Hematologic:  Easy bruising, no easy bleeding.  Neuro: Negative.   Endocrinology: No significant heat or cold intolerance   Musculoskeletal: No myalgia.    Exam:  /87 (BP Location: Left arm, Patient Position: Sitting, Cuff Size: Adult Large)   Pulse 61   SpO2 96%   GENERAL APPEARANCE: healthy, alert and no distress  HEENT: no icterus, no xanthelasmas, normal pupil size and reaction, normal palate, mucosa moist, no central cyanosis  NECK: no adenopathy, no asymmetry, masses, or scars, thyroid normal to palpation and no bruits, JVP not elevated  RESPIRATORY: lungs clear to auscultation - no rales, rhonchi or wheezes, no use of accessory muscles, no retractions, respirations are unlabored, normal respiratory rate  CARDIOVASCULAR: regular rhythm, normal S1 with physiologic split S2, no S3 or S4 and no murmur, click or rub, precordium quiet with normal PMI.  ABDOMEN: soft, non tender, without hepatosplenomegaly, no masses palpable, bowel sounds normal, aorta not enlarged by palpation, no abdominal bruits  EXTREMITIES: peripheral pulses normal, no edema, no bruits  NEURO: alert and oriented to person/place/time, normal speech, gait and affect  VASC: Radial, femoral, dorsalis pedis and posterior tibialis pulses are normal in volumes and symmetric bilaterally. No bruits are heard.  SKIN: no ecchymoses, no rashes    Labs:  CBC RESULTS:   No results found for: \"WBC\", \"RBC\", \"HGB\", \"HCT\", \"MCV\", \"MCH\", \"MCHC\", \"RDW\", \"PLT\"    BMP RESULTS:  Lab Results   Component Value Date    POTASSIUM 4.7 01/05/2024    POTASSIUM 4.6 02/07/2022        INR RESULTS:  Lab Results   Component Value Date "    INR 2.06 (H) 01/05/2024    INR 2.25 (H) 02/07/2022    INR 1.73 (H) 02/01/2022       Procedures:      Assessment and Plan:     Typical atrial flutter    There is encouraging that patient has stayed in normal sinus rhythm since the cardioversion.  It is also encouraging that a transthoracic echocardiogram showed preserved left ventricular ejection fraction.  Patient can discontinue warfarin on February 5, 2024 given that he has no risk factors for stroke.  We will see patient again in person in approximately 6 months.  All questions and concerns were addressed and patient and his sister were happy with the plan.      CC  Patient Care Team:  Calvin Mcgrath MD as PCP - General (Family Medicine)  Gabriela Garcia MD as Assigned Heart and Vascular Provider

## 2024-01-22 NOTE — PATIENT INSTRUCTIONS
Thank you for coming to the [facility] at [place]; please note the following instructions:    1. STOP warfarin Feb. 5th per Dr. Garcia    2.  Follow-up in clinic June or July        If you have any questions regarding your visit, please contact your care team:     CARDIOLOGY  TELEPHONE NUMBER   Alicia CONKLIN, Registered Nurse  Eri BAH, Registered Nurse  Love VILLARREAL, Registered Medical Assistant  Pauline HORNER, Certified Medical Assistant  Ana Paula CARR, Clinic Assistant [phone] (select option 1)    *After hours: [phone]   For Scheduling Appts:     [phone] (select option 1)    *After hours: [phone]   For the Device Clinic (Pacemakers and ICD's)  Alexandra NOVA, Registered Nurse   During business hours: [phone]    *After business hours:  [phone] (select option 4)      Normal test result notifications will be released via [employer] or mailed within 7 business days.  All other test results, will be communicated via telephone once reviewed by your cardiologist.    If you need a medication refill, please contact your pharmacy.  Please allow 3 business days for your refill to be completed.    As always, thank you for trusting us with your health care needs!

## 2024-01-22 NOTE — NURSING NOTE
Writer contacted INR clinic in [place] with [facility] and explained that patient was in NSR today and per Dr. Garcia patient is to continue warfarin intil 2/5/24 then patient can stop.  INR clinic RN verbalized understanding.  Alicia Haynes RN

## 2024-01-22 NOTE — LETTER
1/22/2024      RE: Cruz Patel  [address]  [facility] [facility] [address]       Dear Colleague,    Thank you for the opportunity to participate in the care of your patient, Cruz Patel, at the [facility]. Please see a copy of my visit note below.    HPI: Purpose of visit: Follow-up of atrial flutter    58-year-old gentleman without any known cardiovascular disease and risk factors such as hypertension, diabetes, known coronary heart disease, and previous stroke.      He has a BMI greater than 50, nonalcoholic fatty liver disease, and metabolic syndrome.    Most recently, patient underwent successful electrical cardioversion for atrial flutter on January 5, 2024.  Since the cardioversion, patient has stayed in sinus rhythm and has felt an improvement in his physical stamina.  He did not report any symptoms of palpitations, irregular heartbeat sensation, exertional dyspnea, exertional angina, frequent lightheadedness, presyncope or syncope.    PAST MEDICAL HISTORY:  History reviewed. No pertinent past medical history.    CURRENT MEDICATIONS:  Current Outpatient Medications   Medication Sig Dispense Refill    furosemide (LASIX) 20 MG tablet Take 40 mg by mouth      metoprolol succinate ER (TOPROL XL) 25 MG 24 hr tablet Take 25 mg by mouth daily      omeprazole (PRILOSEC) 20 MG DR capsule Take 20 mg by mouth      potassium chloride ER (KLOR-CON M) 20 MEQ CR tablet Take 20 mEq by mouth      warfarin ANTICOAGULANT (COUMADIN) 5 MG tablet TAKE 1 TABLET BY MOUTH DAILY OR AS INSTRCTED BY THE INR CLINIC         PAST SURGICAL HISTORY:  Past Surgical History:   Procedure Laterality Date    ANESTHESIA CARDIOVERSION N/A 2/7/2022    Procedure: ANESTHESIA, FOR CARDIOVERSION;  Surgeon: GENERIC ANESTHESIA PROVIDER;  Location: [facility] OR    ANESTHESIA CARDIOVERSION N/A 1/5/2024    Procedure: Anesthesia cardioversion @1100;  Surgeon: GENERIC ANESTHESIA  PROVIDER;  Location:  OR       ALLERGIES:   No Known Allergies    FAMILY HISTORY:  - Premature coronary artery disease  - Atrial fibrillation  - Sudden cardiac death     SOCIAL HISTORY:  Social History     Tobacco Use    Smoking status: Never    Smokeless tobacco: Current     Types: Chew   Substance Use Topics    Alcohol use: Yes       ROS:   Constitutional: No fever, chills, or sweats. Weight stable.   ENT: No visual disturbance, ear ache, epistaxis, sore throat.   Cardiovascular: As per HPI.   Respiratory: No cough, hemoptysis.    GI: No nausea, vomiting, hematemesis, melena, or hematochezia.   : No hematuria.   Integument: Negative.   Psychiatric: Negative.   Hematologic:  Easy bruising, no easy bleeding.  Neuro: Negative.   Endocrinology: No significant heat or cold intolerance   Musculoskeletal: No myalgia.    Exam:  /87 (BP Location: Left arm, Patient Position: Sitting, Cuff Size: Adult Large)   Pulse 61   SpO2 96%   GENERAL APPEARANCE: healthy, alert and no distress  HEENT: no icterus, no xanthelasmas, normal pupil size and reaction, normal palate, mucosa moist, no central cyanosis  NECK: no adenopathy, no asymmetry, masses, or scars, thyroid normal to palpation and no bruits, JVP not elevated  RESPIRATORY: lungs clear to auscultation - no rales, rhonchi or wheezes, no use of accessory muscles, no retractions, respirations are unlabored, normal respiratory rate  CARDIOVASCULAR: regular rhythm, normal S1 with physiologic split S2, no S3 or S4 and no murmur, click or rub, precordium quiet with normal PMI.  ABDOMEN: soft, non tender, without hepatosplenomegaly, no masses palpable, bowel sounds normal, aorta not enlarged by palpation, no abdominal bruits  EXTREMITIES: peripheral pulses normal, no edema, no bruits  NEURO: alert and oriented to person/place/time, normal speech, gait and affect  VASC: Radial, femoral, dorsalis pedis and posterior tibialis pulses are normal in volumes and symmetric  "bilaterally. No bruits are heard.  SKIN: no ecchymoses, no rashes    Labs:  CBC RESULTS:   No results found for: \"WBC\", \"RBC\", \"HGB\", \"HCT\", \"MCV\", \"MCH\", \"MCHC\", \"RDW\", \"PLT\"    BMP RESULTS:  Lab Results   Component Value Date    POTASSIUM 4.7 01/05/2024    POTASSIUM 4.6 02/07/2022        INR RESULTS:  Lab Results   Component Value Date    INR 2.06 (H) 01/05/2024    INR 2.25 (H) 02/07/2022    INR 1.73 (H) 02/01/2022       Procedures:      Assessment and Plan:     Typical atrial flutter    There is encouraging that patient has stayed in normal sinus rhythm since the cardioversion.  It is also encouraging that a transthoracic echocardiogram showed preserved left ventricular ejection fraction.  Patient can discontinue warfarin on February 5, 2024 given that he has no risk factors for stroke.  We will see patient again in person in approximately 6 months.  All questions and concerns were addressed and patient and his sister were happy with the plan.        Please do not hesitate to contact me if you have any questions/concerns.     Sincerely,     Gabriela Garcia MD      CC  Patient Care Team:  Calvin Mcgrath MD as PCP - General (Family Medicine)  Gabriela Garcia MD as Assigned Heart and Vascular Provider        "

## 2024-01-23 LAB
ATRIAL RATE - MUSE: 63 BPM
DIASTOLIC BLOOD PRESSURE - MUSE: NORMAL MMHG
INTERPRETATION ECG - MUSE: NORMAL
P AXIS - MUSE: -4 DEGREES
PR INTERVAL - MUSE: 258 MS
QRS DURATION - MUSE: 84 MS
QT - MUSE: 398 MS
QTC - MUSE: 407 MS
R AXIS - MUSE: 60 DEGREES
SYSTOLIC BLOOD PRESSURE - MUSE: NORMAL MMHG
T AXIS - MUSE: 17 DEGREES
VENTRICULAR RATE- MUSE: 63 BPM

## 2024-07-22 ENCOUNTER — OFFICE VISIT (OUTPATIENT)
Dept: CARDIOLOGY | Facility: CLINIC | Age: 59
End: 2024-07-22
Payer: MEDICARE

## 2024-07-22 VITALS — OXYGEN SATURATION: 95 % | DIASTOLIC BLOOD PRESSURE: 85 MMHG | HEART RATE: 102 BPM | SYSTOLIC BLOOD PRESSURE: 148 MMHG

## 2024-07-22 DIAGNOSIS — I48.3 TYPICAL ATRIAL FLUTTER (H): Primary | ICD-10-CM

## 2024-07-22 DIAGNOSIS — I48.0 PAF (PAROXYSMAL ATRIAL FIBRILLATION) (H): ICD-10-CM

## 2024-07-22 PROCEDURE — 99214 OFFICE O/P EST MOD 30 MIN: CPT | Performed by: INTERNAL MEDICINE

## 2024-07-22 NOTE — NURSING NOTE
Chief Complaint   Patient presents with    Follow Up     Reason for visit: Return EP for 6 month follow-up       Initial BP (!) 148/85 (BP Location: Left arm, Patient Position: Sitting, Cuff Size: Adult Large)   Pulse 102   SpO2 95%  There is no height or weight on file to calculate BMI..  BP completed using cuff size: BRENDA Bryan

## 2024-07-22 NOTE — LETTER
7/22/2024      RE: Cruz Patel  [address]  [facility] [address]       Dear Colleague,    Thank you for the opportunity to participate in the care of your patient, Cruz Patel, at the [facility] CLINIC [facility]. Please see a copy of my visit note below.    HPI: Purpose of visit: Follow-up for atrial flutter    58-year-old gentleman without any known cardiovascular disease and risk factors such as hypertension, diabetes, known coronary heart disease, and previous stroke.      He has a BMI greater than 50, nonalcoholic fatty liver disease, and metabolic syndrome.     Most recently, patient underwent successful electrical cardioversion for atrial flutter on January 5, 2024.  Since the cardioversion, patient has stayed in sinus rhythm and has felt an improvement in his physical stamina.  He did not report any symptoms of palpitations, irregular heartbeat sensation, exertional dyspnea, exertional angina, frequent lightheadedness, presyncope or syncope.    Patient's last visit with me was on January 22, 2024.  In the last 6 months, patient has been doing well without any known recurrence of atrial flutter. He did not report any symptoms of palpitations, irregular heartbeat sensation, exertional dyspnea, exertional angina, frequent lightheadedness, presyncope or syncope.        PAST MEDICAL HISTORY:  History reviewed. No pertinent past medical history.    CURRENT MEDICATIONS:  Current Outpatient Medications   Medication Sig Dispense Refill     furosemide (LASIX) 20 MG tablet Take 40 mg by mouth       metoprolol succinate ER (TOPROL XL) 25 MG 24 hr tablet Take 25 mg by mouth daily       omeprazole (PRILOSEC) 20 MG DR capsule Take 20 mg by mouth       potassium chloride ER (KLOR-CON M) 20 MEQ CR tablet Take 20 mEq by mouth       warfarin ANTICOAGULANT (COUMADIN) 5 MG tablet TAKE 1 TABLET BY MOUTH DAILY OR AS INSTRCTED BY THE INR CLINIC         PAST  SURGICAL HISTORY:  Past Surgical History:   Procedure Laterality Date     ANESTHESIA CARDIOVERSION N/A 2/7/2022    Procedure: ANESTHESIA, FOR CARDIOVERSION;  Surgeon: GENERIC ANESTHESIA PROVIDER;  Location: [facility] OR     ANESTHESIA CARDIOVERSION N/A 1/5/2024    Procedure: Anesthesia cardioversion @1100;  Surgeon: GENERIC ANESTHESIA PROVIDER;  Location: [facility] OR       ALLERGIES:   No Known Allergies    FAMILY HISTORY:  - Premature coronary artery disease  - Atrial fibrillation  - Sudden cardiac death     SOCIAL HISTORY:  Social History     Tobacco Use     Smoking status: Never     Smokeless tobacco: Current     Types: Chew   Substance Use Topics     Alcohol use: Yes       ROS:   Constitutional: No fever, chills, or sweats. Weight stable.   ENT: No visual disturbance, ear ache, epistaxis, sore throat.   Cardiovascular: As per HPI.   Respiratory: No cough, hemoptysis.    GI: No nausea, vomiting, hematemesis, melena, or hematochezia.   : No hematuria.   Integument: Negative.   Psychiatric: Negative.   Hematologic:  Easy bruising, no easy bleeding.  Neuro: Negative.   Endocrinology: No significant heat or cold intolerance   Musculoskeletal: No myalgia.    Exam:  BP (!) 148/85 (BP Location: Left arm, Patient Position: Sitting, Cuff Size: Adult Large)   Pulse 102   SpO2 95%   GENERAL APPEARANCE: healthy, alert and no distress  HEENT: no icterus, no xanthelasmas, normal pupil size and reaction, normal palate, mucosa moist, no central cyanosis  NECK: no adenopathy, no asymmetry, masses, or scars, thyroid normal to palpation and no bruits, JVP not elevated  RESPIRATORY: lungs clear to auscultation - no rales, rhonchi or wheezes, no use of accessory muscles, no retractions, respirations are unlabored, normal respiratory rate  CARDIOVASCULAR: regular rhythm, normal S1 with physiologic split S2, no S3 or S4 and no murmur, click or rub, precordium quiet with normal PMI.  ABDOMEN: soft, non tender, without hepatosplenomegaly, no  "masses palpable, bowel sounds normal, aorta not enlarged by palpation, no abdominal bruits  EXTREMITIES: peripheral pulses normal, no edema, no bruits  NEURO: alert and oriented to person/place/time, normal speech, gait and affect  VASC: Radial, femoral, dorsalis pedis and posterior tibialis pulses are normal in volumes and symmetric bilaterally. No bruits are heard.  SKIN: no ecchymoses, no rashes    Labs:  CBC RESULTS:   No results found for: \"WBC\", \"RBC\", \"HGB\", \"HCT\", \"MCV\", \"MCH\", \"MCHC\", \"RDW\", \"PLT\"    BMP RESULTS:  Lab Results   Component Value Date    POTASSIUM 4.7 01/05/2024    POTASSIUM 4.6 02/07/2022        INR RESULTS:  Lab Results   Component Value Date    INR 2.06 (H) 01/05/2024    INR 2.25 (H) 02/07/2022    INR 1.73 (H) 02/01/2022       Procedures:      Assessment and Plan:   Typical atrial flutter     There is encouraging that patient has stayed in normal sinus rhythm since the cardioversion.  It is also encouraging that a transthoracic echocardiogram showed preserved left ventricular ejection fraction.  Patient has discontinued warfarin because he has no stroke risk factors.  We will see patient again in person in approximately 12 months.  All questions and concerns were addressed and patient and his sister were happy with the plan.    CC  Patient Care Team:  Calvin Mcgrath MD as PCP - General (Family Medicine)  Gabriela Garcia MD as Assigned Heart and Vascular Provider  SELF, REFERRED        Please do not hesitate to contact me if you have any questions/concerns.     Sincerely,     Gabriela Garcia MD  "

## 2024-07-22 NOTE — PATIENT INSTRUCTIONS
Thank you for coming to the [facility] at [place]; please note the following instructions:    1. Dr. Gabriela Garcia recommends to follow up in 1 year.  The cardiology team will contact you to schedule when the time gets closer.          If you have any questions regarding your visit, please contact your care team:     CARDIOLOGY  TELEPHONE NUMBER   Alicia CONKLIN, Registered Nurse  Eri BAH, Registered Nurse  Love VILLARREAL, Registered Medical Assistant  Pauline HORNER, Certified Medical Assistant  Ana Paula CARR, Clinic Assistant [phone] (select option 1)    *After hours: [phone]   For Scheduling Appts:     [phone] (select option 1)    *After hours: [phone]   For the Device Clinic (Pacemakers and ICD's)  Alexandra NOVA, Registered Nurse   During business hours: [phone]    *After business hours:  [phone] (select option 4)      Normal test result notifications will be released via [address] or mailed within 7 business days.  All other test results, will be communicated via telephone once reviewed by your cardiologist.    If you need a medication refill, please contact your pharmacy.  Please allow 3 business days for your refill to be completed.    As always, thank you for trusting us with your health care needs!

## 2024-07-22 NOTE — PROGRESS NOTES
HPI: Purpose of visit: Follow-up for atrial flutter    58-year-old gentleman without any known cardiovascular disease and risk factors such as hypertension, diabetes, known coronary heart disease, and previous stroke.      He has a BMI greater than 50, nonalcoholic fatty liver disease, and metabolic syndrome.     Most recently, patient underwent successful electrical cardioversion for atrial flutter on January 5, 2024.  Since the cardioversion, patient has stayed in sinus rhythm and has felt an improvement in his physical stamina.  He did not report any symptoms of palpitations, irregular heartbeat sensation, exertional dyspnea, exertional angina, frequent lightheadedness, presyncope or syncope.    Patient's last visit with me was on January 22, 2024.  In the last 6 months, patient has been doing well without any known recurrence of atrial flutter. He did not report any symptoms of palpitations, irregular heartbeat sensation, exertional dyspnea, exertional angina, frequent lightheadedness, presyncope or syncope.        PAST MEDICAL HISTORY:  History reviewed. No pertinent past medical history.    CURRENT MEDICATIONS:  Current Outpatient Medications   Medication Sig Dispense Refill    furosemide (LASIX) 20 MG tablet Take 40 mg by mouth      metoprolol succinate ER (TOPROL XL) 25 MG 24 hr tablet Take 25 mg by mouth daily      omeprazole (PRILOSEC) 20 MG DR capsule Take 20 mg by mouth      potassium chloride ER (KLOR-CON M) 20 MEQ CR tablet Take 20 mEq by mouth      warfarin ANTICOAGULANT (COUMADIN) 5 MG tablet TAKE 1 TABLET BY MOUTH DAILY OR AS INSTRCTED BY THE INR CLINIC         PAST SURGICAL HISTORY:  Past Surgical History:   Procedure Laterality Date    ANESTHESIA CARDIOVERSION N/A 2/7/2022    Procedure: ANESTHESIA, FOR CARDIOVERSION;  Surgeon: GENERIC ANESTHESIA PROVIDER;  Location: [facility] OR    ANESTHESIA CARDIOVERSION N/A 1/5/2024    Procedure: Anesthesia cardioversion @1100;  Surgeon: GENERIC ANESTHESIA PROVIDER;   Location: [facility] OR       ALLERGIES:   No Known Allergies    FAMILY HISTORY:  - Premature coronary artery disease  - Atrial fibrillation  - Sudden cardiac death     SOCIAL HISTORY:  Social History     Tobacco Use    Smoking status: Never    Smokeless tobacco: Current     Types: Chew   Substance Use Topics    Alcohol use: Yes       ROS:   Constitutional: No fever, chills, or sweats. Weight stable.   ENT: No visual disturbance, ear ache, epistaxis, sore throat.   Cardiovascular: As per HPI.   Respiratory: No cough, hemoptysis.    GI: No nausea, vomiting, hematemesis, melena, or hematochezia.   : No hematuria.   Integument: Negative.   Psychiatric: Negative.   Hematologic:  Easy bruising, no easy bleeding.  Neuro: Negative.   Endocrinology: No significant heat or cold intolerance   Musculoskeletal: No myalgia.    Exam:  BP (!) 148/85 (BP Location: Left arm, Patient Position: Sitting, Cuff Size: Adult Large)   Pulse 102   SpO2 95%   GENERAL APPEARANCE: healthy, alert and no distress  HEENT: no icterus, no xanthelasmas, normal pupil size and reaction, normal palate, mucosa moist, no central cyanosis  NECK: no adenopathy, no asymmetry, masses, or scars, thyroid normal to palpation and no bruits, JVP not elevated  RESPIRATORY: lungs clear to auscultation - no rales, rhonchi or wheezes, no use of accessory muscles, no retractions, respirations are unlabored, normal respiratory rate  CARDIOVASCULAR: regular rhythm, normal S1 with physiologic split S2, no S3 or S4 and no murmur, click or rub, precordium quiet with normal PMI.  ABDOMEN: soft, non tender, without hepatosplenomegaly, no masses palpable, bowel sounds normal, aorta not enlarged by palpation, no abdominal bruits  EXTREMITIES: peripheral pulses normal, no edema, no bruits  NEURO: alert and oriented to person/place/time, normal speech, gait and affect  VASC: Radial, femoral, dorsalis pedis and posterior tibialis pulses are normal in volumes and symmetric bilaterally.  "No bruits are heard.  SKIN: no ecchymoses, no rashes    Labs:  CBC RESULTS:   No results found for: \"WBC\", \"RBC\", \"HGB\", \"HCT\", \"MCV\", \"MCH\", \"MCHC\", \"RDW\", \"PLT\"    BMP RESULTS:  Lab Results   Component Value Date    POTASSIUM 4.7 01/05/2024    POTASSIUM 4.6 02/07/2022        INR RESULTS:  Lab Results   Component Value Date    INR 2.06 (H) 01/05/2024    INR 2.25 (H) 02/07/2022    INR 1.73 (H) 02/01/2022       Procedures:      Assessment and Plan:   Typical atrial flutter     There is encouraging that patient has stayed in normal sinus rhythm since the cardioversion.  It is also encouraging that a transthoracic echocardiogram showed preserved left ventricular ejection fraction.  Patient has discontinued warfarin because he has no stroke risk factors.  We will see patient again in person in approximately 12 months.  All questions and concerns were addressed and patient and his sister were happy with the plan.    CC  Patient Care Team:  Calvin Mcgrath MD as PCP - General (Family Medicine)  Gabriela Garcia MD as Assigned Heart and Vascular Provider  SELF, REFERRED      "

## 2025-08-04 ENCOUNTER — OFFICE VISIT (OUTPATIENT)
Dept: CARDIOLOGY | Facility: CLINIC | Age: 60
End: 2025-08-04
Payer: MEDICARE

## 2025-08-04 VITALS
HEART RATE: 72 BPM | DIASTOLIC BLOOD PRESSURE: 87 MMHG | OXYGEN SATURATION: 96 % | SYSTOLIC BLOOD PRESSURE: 135 MMHG | WEIGHT: 315 LBS

## 2025-08-04 DIAGNOSIS — I48.0 PAF (PAROXYSMAL ATRIAL FIBRILLATION) (H): ICD-10-CM

## 2025-08-04 DIAGNOSIS — I48.3 TYPICAL ATRIAL FLUTTER (H): Primary | ICD-10-CM

## 2025-08-04 PROCEDURE — 99213 OFFICE O/P EST LOW 20 MIN: CPT | Performed by: INTERNAL MEDICINE

## 2025-08-04 PROCEDURE — 3079F DIAST BP 80-89 MM HG: CPT | Performed by: INTERNAL MEDICINE

## 2025-08-04 PROCEDURE — 3075F SYST BP GE 130 - 139MM HG: CPT | Performed by: INTERNAL MEDICINE

## 2025-08-04 RX ORDER — POTASSIUM CHLORIDE 750 MG/1
TABLET, EXTENDED RELEASE ORAL
COMMUNITY
Start: 2025-07-14

## 2025-08-04 RX ORDER — LISINOPRIL 10 MG/1
10 TABLET ORAL DAILY
COMMUNITY

## 2025-08-04 RX ORDER — OXYBUTYNIN CHLORIDE 10 MG/1
TABLET, EXTENDED RELEASE ORAL
COMMUNITY